# Patient Record
Sex: FEMALE | Race: WHITE | NOT HISPANIC OR LATINO | Employment: PART TIME
[De-identification: names, ages, dates, MRNs, and addresses within clinical notes are randomized per-mention and may not be internally consistent; named-entity substitution may affect disease eponyms.]

---

## 2017-11-26 ENCOUNTER — HEALTH MAINTENANCE LETTER (OUTPATIENT)
Age: 37
End: 2017-11-26

## 2018-07-16 ENCOUNTER — OFFICE VISIT (OUTPATIENT)
Dept: FAMILY MEDICINE | Facility: OTHER | Age: 38
End: 2018-07-16
Attending: NURSE PRACTITIONER
Payer: COMMERCIAL

## 2018-07-16 VITALS
DIASTOLIC BLOOD PRESSURE: 70 MMHG | BODY MASS INDEX: 30.18 KG/M2 | HEIGHT: 66 IN | SYSTOLIC BLOOD PRESSURE: 110 MMHG | TEMPERATURE: 97.9 F | WEIGHT: 187.8 LBS | OXYGEN SATURATION: 100 % | HEART RATE: 63 BPM

## 2018-07-16 DIAGNOSIS — Z76.89 ENCOUNTER TO ESTABLISH CARE: Primary | ICD-10-CM

## 2018-07-16 DIAGNOSIS — N64.4 BREAST PAIN: ICD-10-CM

## 2018-07-16 DIAGNOSIS — N60.19 FIBROCYSTIC BREAST CHANGES, UNSPECIFIED LATERALITY: ICD-10-CM

## 2018-07-16 DIAGNOSIS — Z12.31 ENCOUNTER FOR SCREENING MAMMOGRAM FOR BREAST CANCER: ICD-10-CM

## 2018-07-16 DIAGNOSIS — Z12.4 SCREENING FOR CERVICAL CANCER: ICD-10-CM

## 2018-07-16 DIAGNOSIS — Z00.00 ROUTINE GENERAL MEDICAL EXAMINATION AT A HEALTH CARE FACILITY: ICD-10-CM

## 2018-07-16 LAB
BASOPHILS # BLD AUTO: 0 10E9/L (ref 0–0.2)
BASOPHILS NFR BLD AUTO: 0.5 %
DIFFERENTIAL METHOD BLD: ABNORMAL
EOSINOPHIL # BLD AUTO: 0.3 10E9/L (ref 0–0.7)
EOSINOPHIL NFR BLD AUTO: 4.3 %
ERYTHROCYTE [DISTWIDTH] IN BLOOD BY AUTOMATED COUNT: 13.4 % (ref 10–15)
HCT VFR BLD AUTO: 38.2 % (ref 35–47)
HGB BLD-MCNC: 12.6 G/DL (ref 11.7–15.7)
LYMPHOCYTES # BLD AUTO: 2.1 10E9/L (ref 0.8–5.3)
LYMPHOCYTES NFR BLD AUTO: 32.2 %
MCH RBC QN AUTO: 26.2 PG (ref 26.5–33)
MCHC RBC AUTO-ENTMCNC: 33 G/DL (ref 31.5–36.5)
MCV RBC AUTO: 79 FL (ref 78–100)
MONOCYTES # BLD AUTO: 0.4 10E9/L (ref 0–1.3)
MONOCYTES NFR BLD AUTO: 6.8 %
NEUTROPHILS # BLD AUTO: 3.7 10E9/L (ref 1.6–8.3)
NEUTROPHILS NFR BLD AUTO: 56.2 %
PLATELET # BLD AUTO: 236 10E9/L (ref 150–450)
RBC # BLD AUTO: 4.81 10E12/L (ref 3.8–5.2)
TSH SERPL DL<=0.005 MIU/L-ACNC: 1.84 MU/L (ref 0.4–4)
WBC # BLD AUTO: 6.5 10E9/L (ref 4–11)

## 2018-07-16 PROCEDURE — 99000 SPECIMEN HANDLING OFFICE-LAB: CPT | Performed by: NURSE PRACTITIONER

## 2018-07-16 PROCEDURE — 87624 HPV HI-RISK TYP POOLED RSLT: CPT | Mod: 90 | Performed by: NURSE PRACTITIONER

## 2018-07-16 PROCEDURE — 88142 CYTOPATH C/V THIN LAYER: CPT | Performed by: NURSE PRACTITIONER

## 2018-07-16 PROCEDURE — 85025 COMPLETE CBC W/AUTO DIFF WBC: CPT | Performed by: NURSE PRACTITIONER

## 2018-07-16 PROCEDURE — 84443 ASSAY THYROID STIM HORMONE: CPT | Performed by: NURSE PRACTITIONER

## 2018-07-16 PROCEDURE — 36415 COLL VENOUS BLD VENIPUNCTURE: CPT | Performed by: NURSE PRACTITIONER

## 2018-07-16 PROCEDURE — 99385 PREV VISIT NEW AGE 18-39: CPT | Performed by: NURSE PRACTITIONER

## 2018-07-16 ASSESSMENT — ANXIETY QUESTIONNAIRES
6. BECOMING EASILY ANNOYED OR IRRITABLE: NOT AT ALL
GAD7 TOTAL SCORE: 0
2. NOT BEING ABLE TO STOP OR CONTROL WORRYING: NOT AT ALL
1. FEELING NERVOUS, ANXIOUS, OR ON EDGE: NOT AT ALL
7. FEELING AFRAID AS IF SOMETHING AWFUL MIGHT HAPPEN: NOT AT ALL
3. WORRYING TOO MUCH ABOUT DIFFERENT THINGS: NOT AT ALL
5. BEING SO RESTLESS THAT IT IS HARD TO SIT STILL: NOT AT ALL
4. TROUBLE RELAXING: NOT AT ALL
IF YOU CHECKED OFF ANY PROBLEMS ON THIS QUESTIONNAIRE, HOW DIFFICULT HAVE THESE PROBLEMS MADE IT FOR YOU TO DO YOUR WORK, TAKE CARE OF THINGS AT HOME, OR GET ALONG WITH OTHER PEOPLE: NOT DIFFICULT AT ALL

## 2018-07-16 ASSESSMENT — PAIN SCALES - GENERAL: PAINLEVEL: NO PAIN (0)

## 2018-07-16 NOTE — PROGRESS NOTES
SUBJECTIVE:   CC: Aura Carmona is an 37 year old woman who presents for preventive health visit.       Healthy Habits:    Do you get at least three servings of calcium containing foods daily (dairy, green leafy vegetables, etc.)? yes    Amount of exercise or daily activities, outside of work: Walking    Problems taking medications regularly No    Medication side effects: No    Have you had an eye exam in the past two years? yes    Do you see a dentist twice per year? yes    Do you have sleep apnea, excessive snoring or daytime drowsiness?no      Breast pain - size disparity, pain is intermittent      PHQ-9 SCORE 2018   Total Score 0     LUZ-7 SCORE 2018   Total Score 0         Abuse: Current or Past(Physical, Sexual or Emotional)- No  Do you feel safe in your environment - Yes    Social History   Substance Use Topics     Smoking status: Never Smoker     Smokeless tobacco: Never Used     Alcohol use Yes      Comment: occasional     If you drink alcohol do you typically have >3 drinks per day or >7 drinks per week? Yes - AUDIT SCORE:     No flowsheet data found.Not much.                     Reviewed orders with patient.  Reviewed health maintenance and updated orders accordingly - Yes  Labs reviewed in EPIC  BP Readings from Last 3 Encounters:   18 110/70   14 120/80   13 100/80    Wt Readings from Last 3 Encounters:   18 187 lb 12.8 oz (85.2 kg)   14 185 lb (83.9 kg)   13 180 lb (81.6 kg)                  Patient Active Problem List   Diagnosis     Family history of melanoma     Past Surgical History:   Procedure Laterality Date     APPENDECTOMY  2009    [place]     C/SECTION, CLASSICAL      x's 3      SECTION  11-24-15    laparotomy     CHOLECYSTECTOMY       GALLBLADDER SURGERY      [facility] teeth         Social History   Substance Use Topics     Smoking status: Never Smoker     Smokeless tobacco: Never Used     Alcohol use Yes     "  Comment: occasional     Family History   Problem Relation Age of Onset     Hypertension Father      Diabetes Maternal Grandmother      Alzheimer Disease Maternal Grandfather      HEART DISEASE Paternal Grandmother      HEART DISEASE Paternal Grandfather          No current outpatient prescriptions on file.     No Known Allergies  Recent Labs   Lab Test  13   1459   TSH  1.39        No prior mammogram        History of abnormal Pap smear: NO - age 30-65 PAP every 5 years with negative HPV co-testing recommended    PAP / HPV 2013   PAP NIL     Reviewed and updated as needed this visit by clinical staff  Tobacco  Allergies  Meds  Med Hx  Surg Hx  Fam Hx  Soc Hx        Reviewed and updated as needed this visit by Provider        History reviewed. No pertinent past medical history.   Past Surgical History:   Procedure Laterality Date     APP[place]OMY      [place]     C/SECTION, CLASSICAL      x's 3      SECTION  11-24-15    laparotomy     CHOLECYSTECTOMY       GALLBLADDER SURGERY      [facility]       Obstetric History     No data available          ROS:  CONSTITUTIONAL: NEGATIVE for fever, chills, change in weight  INTEGUMENTARU/SKIN: NEGATIVE for worrisome rashes, moles or lesions  EYES: NEGATIVE for vision changes or irritation  ENT: NEGATIVE for ear, mouth and throat problems  RESP: NEGATIVE for significant cough or SOB  BREAST: breast tenderness  CV: NEGATIVE for chest pain, palpitations or peripheral edema  GI: NEGATIVE for nausea, abdominal pain, heartburn, or change in bowel habits  : NEGATIVE for unusual urinary or vaginal symptoms. Periods are regular.  MUSCULOSKELETAL: NEGATIVE for significant arthralgias or myalgia  NEURO: NEGATIVE for weakness, dizziness or paresthesias  PSYCHIATRIC: NEGATIVE for changes in mood or affect    OBJECTIVE:   /70  Pulse 63  Temp 97.9  F (36.6  C)  Ht 5' 6\" (1.676 m)  Wt 187 lb 12.8 oz (85.2 kg)  SpO2 100%  " Breastfeeding? No  BMI 30.31 kg/m2       EXAM:  GENERAL: healthy, alert and no distress  EYES: Eyes grossly normal to inspection, PERRL and conjunctivae and sclerae normal  HENT: ear canals and TM's normal, nose and mouth without ulcers or lesions  NECK: no adenopathy, no asymmetry, masses, or scars and thyroid normal to palpation  RESP: lungs clear to auscultation - no rales, rhonchi or wheezes  BREAST: Right breast is larger than the left, chronically - mild diffuse breast tenderness bilaterally.  Palpable fibrocystic findings - no nipple discharge, no axillary mass  CV: regular rate and rhythm, normal S1 S2, no S3 or S4, no murmur, click or rub, no peripheral edema and peripheral pulses strong  ABDOMEN: soft, nontender, no hepatosplenomegaly, no masses and bowel sounds normal   (female): normal female external genitalia, normal urethral meatus, vaginal mucosa, normal cervix/adnexa/uterus without masses or discharge  MS: no gross musculoskeletal defects noted, no edema  SKIN: no suspicious lesions or rashes  PSYCH: mentation appears normal, affect normal/bright        ASSESSMENT/PLAN:   1. Encounter to establish care  - Chart updated    2. Routine general medical examination at a health care facility  - TSH with free T4 reflex  - CBC with platelets differential  - Annual physical 1 year    3. Screening for cervical cancer  - A pap thin layer screen with  HPV - recommended age 30 - 65 years (select HPV order below)  - HPV High Risk Types DNA Cervical    4. Encounter for screening mammogram for breast cancer  - MA Screening Digital Bilateral ([place] [facility]); Future    5. Breast pain  - MA Screening Digital Bilateral ([place] [facility]); Future    6. Fibrocystic breast changes, unspecified laterality  - Mammogram ordered        COUNSELING:   Reviewed preventive health counseling, as reflected in patient instructions       Regular exercise       Healthy diet/nutrition    BP Readings from Last 1 Encounters:   07/16/18  "110/70     Estimated body mass index is 30.31 kg/(m^2) as calculated from the following:    Height as of this encounter: 5' 6\" (1.676 m).    Weight as of this encounter: 187 lb 12.8 oz (85.2 kg).           reports that she has never smoked. She has never used smokeless tobacco.      Counseling Resources:  ATP IV Guidelines  Pooled Cohorts Equation Calculator  Breast Cancer Risk Calculator  FRAX Risk Assessment  ICSI Preventive Guidelines  Dietary Guidelines for Americans, 2010  USDA's MyPlate  ASA Prophylaxis  Lung CA Screening    Zunilda Matute NP  [facility] [place]  "

## 2018-07-16 NOTE — MR AVS SNAPSHOT
After Visit Summary   7/16/2018    Aura Carmona    MRN: [id number]           Patient Information     Date Of Birth          1980        Visit Information        Provider Department      7/16/2018 10:00 AM Zunilda Matute NP [facility]        Today's Diagnoses     Encounter to establish care    -  1    Routine general medical examination at a health care facility        Screening for cervical cancer        Encounter for screening mammogram for breast cancer        Breast pain        Fibrocystic breast changes, unspecified laterality          Care Instructions        ASSESSMENT/PLAN:   1. Encounter to establish care  - Chart updated    2. Routine general medical examination at a health care facility  - TSH with free T4 reflex  - CBC with platelets differential  - Annual physical 1 year    3. Screening for cervical cancer  - A pap thin layer screen with  HPV - recommended age 30 - 65 years (select HPV order below)  - HPV High Risk Types DNA Cervical    4. Encounter for screening mammogram for breast cancer  - MA Screening Digital Bilateral ([place] [facility]); Future    5. Breast pain  - MA Screening Digital Bilateral ([place] [facility]); Future    6. Fibrocystic breast changes, unspecified laterality  - Mammogram ordered          Preventive Health Recommendations  Female Ages 26 - 39  Yearly exam:   See your health care provider every year in order to    Review health changes.     Discuss preventive care.      Review your medicines if you your doctor has prescribed any.    Until age 30: Get a Pap test every three years (more often if you have had an abnormal result).    After age 30: Talk to your doctor about whether you should have a Pap test every 3 years or have a Pap test with HPV screening every 5 years.   You do not need a Pap test if your uterus was removed (hysterectomy) and you have not had cancer.  You should be tested each year for STDs (sexually transmitted diseases), if  you're at risk.   Talk to your provider about how often to have your cholesterol checked.  If you are at risk for diabetes, you should have a diabetes test (fasting glucose).  Shots: Get a flu shot each year. Get a tetanus shot every 10 years.   Nutrition:     Eat at least 5 servings of fruits and vegetables each day.    Eat whole-grain bread, whole-wheat pasta and brown rice instead of white grains and rice.    Get adequate Calcium and Vitamin D.     Lifestyle    Exercise at least 150 minutes a week (30 minutes a day, 5 days of the week). This will help you control your weight and prevent disease.    Limit alcohol to one drink per day.    No smoking.     Wear sunscreen to prevent skin cancer.    See your dentist every six months for an exam and cleaning.            Follow-ups after your visit        Future tests that were ordered for you today     Open Future Orders        Priority Expected Expires Ordered    MA Screening Digital Bilateral ([place] [facility]) Routine  7/16/2019 7/16/2018            Who to contact     If you have questions or need follow up information about today's clinic visit or your schedule please contact [facility] directly at [phone].  Normal or non-critical lab and imaging results will be communicated to you by MyChart, letter or phone within 4 business days after the clinic has received the results. If you do not hear from us within 7 days, please contact the clinic through [employer] or phone. If you have a critical or abnormal lab result, we will notify you by phone as soon as possible.  Submit refill requests through "[employer] or call your pharmacy and they will forward the refill request to us. Please allow 3 business days for your refill to be completed.          Additional Information About Your Visit        "[employer] Information     "[employer] lets you send messages to your doctor, view your test results, renew your prescriptions, schedule appointments and more. To sign up, go  "to [email or web address] . Click on \"Log in\" on the left side of the screen, which will take you to the Welcome page. Then click on \"Sign up Now\" on the right side of the page.     You will be asked to enter the access code listed below, as well as some personal information. Please follow the directions to create your username and password.     Your access code is: [email or web address]  Expires: 10/14/2018  9:35 AM     Your access code will  in 90 days. If you need help or a new code, please call your [facility] clinic or [phone].        Care EveryWhere ID     This is your Care EveryWhere ID. This could be used by other organizations to access your [facility] medical records  [id number]        Your Vitals Were     Pulse Temperature Height Pulse Oximetry Breastfeeding? BMI (Body Mass Index)    63 97.9  F (36.6  C) 5' 6\" (1.676 m) 100% No 30.31 kg/m2       Blood Pressure from Last 3 Encounters:   18 110/70   14 120/80   13 100/80    Weight from Last 3 Encounters:   18 187 lb 12.8 oz (85.2 kg)   14 185 lb (83.9 kg)   13 180 lb (81.6 kg)              We Performed the Following     A pap thin layer screen with  HPV - recommended age 30 - 65 years (select HPV order below)     CBC with platelets differential     HPV High Risk Types DNA Cervical     TSH with free T4 reflex        Primary Care Provider Office Phone # Fax #    Zunilda Matute [phone] [address]  [place] [address]        Equal Access to Services     [facility] : Hadii libia Hahn, kayy de la rosa, tammy rodríguezalkeesha rey. So [facility] [phone].    ATENCIÓN: Si habla español, tiene a gilmore disposición servicios gratuitos de asistencia lingüística. Llame al [phone].    We comply with applicable federal civil rights laws and [place] laws. We do not discriminate on the basis of race, color, national origin, age, disability, " sex, sexual orientation, or gender identity.            Thank you!     Thank you for choosing [facility]  for your care. Our goal is always to provide you with excellent care. Hearing back from our patients is one way we can continue to improve our services. Please take a few minutes to complete the written survey that you may receive in the mail after your visit with us. Thank you!             Your Updated Medication List - Protect others around you: Learn how to safely use, store and throw away your medicines at www.disposemymeds.org.      Notice  As of 7/16/2018 11:15 AM    You have not been prescribed any medications.

## 2018-07-16 NOTE — NURSING NOTE
"Chief Complaint   Patient presents with     Establish Care     Physical       Initial /70  Pulse 63  Temp 97.9  F (36.6  C)  Ht 5' 6\" (1.676 m)  Wt 187 lb 12.8 oz (85.2 kg)  SpO2 100%  Breastfeeding? No  BMI 30.31 kg/m2 Estimated body mass index is 30.31 kg/(m^2) as calculated from the following:    Height as of this encounter: 5' 6\" (1.676 m).    Weight as of this encounter: 187 lb 12.8 oz (85.2 kg).  Medication Reconciliation: complete    DARLING Dawn    "

## 2018-07-16 NOTE — PATIENT INSTRUCTIONS
ASSESSMENT/PLAN:   1. Encounter to establish care  - Chart updated    2. Routine general medical examination at a health care facility  - TSH with free T4 reflex  - CBC with platelets differential  - Annual physical 1 year    3. Screening for cervical cancer  - A pap thin layer screen with  HPV - recommended age 30 - 65 years (select HPV order below)  - HPV High Risk Types DNA Cervical    4. Encounter for screening mammogram for breast cancer  - MA Screening Digital Bilateral ([facility]); Future    5. Breast pain  - MA Screening Digital Bilateral ([facility]); Future    6. Fibrocystic breast changes, unspecified laterality  - Mammogram ordered          Preventive Health Recommendations  Female Ages 26 - 39  Yearly exam:   See your health care provider every year in order to    Review health changes.     Discuss preventive care.      Review your medicines if you your doctor has prescribed any.    Until age 30: Get a Pap test every three years (more often if you have had an abnormal result).    After age 30: Talk to your doctor about whether you should have a Pap test every 3 years or have a Pap test with HPV screening every 5 years.   You do not need a Pap test if your uterus was removed (hysterectomy) and you have not had cancer.  You should be tested each year for STDs (sexually transmitted diseases), if you're at risk.   Talk to your provider about how often to have your cholesterol checked.  If you are at risk for diabetes, you should have a diabetes test (fasting glucose).  Shots: Get a flu shot each year. Get a tetanus shot every 10 years.   Nutrition:     Eat at least 5 servings of fruits and vegetables each day.    Eat whole-grain bread, whole-wheat pasta and brown rice instead of white grains and rice.    Get adequate Calcium and Vitamin D.     Lifestyle    Exercise at least 150 minutes a week (30 minutes a day, 5 days of the week). This will help you control your weight and prevent  disease.    Limit alcohol to one drink per day.    No smoking.     Wear sunscreen to prevent skin cancer.    See your dentist every six months for an exam and cleaning.

## 2018-07-17 ASSESSMENT — ANXIETY QUESTIONNAIRES: GAD7 TOTAL SCORE: 0

## 2018-07-17 ASSESSMENT — PATIENT HEALTH QUESTIONNAIRE - PHQ9: SUM OF ALL RESPONSES TO PHQ QUESTIONS 1-9: 0

## 2018-07-18 ENCOUNTER — HEALTH MAINTENANCE LETTER (OUTPATIENT)
Age: 38
End: 2018-07-18

## 2018-07-20 LAB
COPATH REPORT: NORMAL
PAP: NORMAL

## 2018-07-23 LAB
FINAL DIAGNOSIS: NORMAL
HPV HR 12 DNA CVX QL NAA+PROBE: NEGATIVE
HPV16 DNA SPEC QL NAA+PROBE: NEGATIVE
HPV18 DNA SPEC QL NAA+PROBE: NEGATIVE
SPECIMEN DESCRIPTION: NORMAL
SPECIMEN SOURCE CVX/VAG CYTO: NORMAL

## 2018-08-01 ENCOUNTER — RADIANT APPOINTMENT (OUTPATIENT)
Dept: MAMMOGRAPHY | Facility: OTHER | Age: 38
End: 2018-08-01
Attending: NURSE PRACTITIONER
Payer: COMMERCIAL

## 2018-08-01 DIAGNOSIS — N64.4 BREAST PAIN: ICD-10-CM

## 2018-08-01 DIAGNOSIS — Z12.31 ENCOUNTER FOR SCREENING MAMMOGRAM FOR BREAST CANCER: ICD-10-CM

## 2018-08-01 PROCEDURE — 77067 SCR MAMMO BI INCL CAD: CPT | Mod: TC

## 2019-09-26 ENCOUNTER — TRANSFERRED RECORDS (OUTPATIENT)
Dept: HEALTH INFORMATION MANAGEMENT | Facility: CLINIC | Age: 39
End: 2019-09-26

## 2019-09-30 ENCOUNTER — OFFICE VISIT (OUTPATIENT)
Dept: FAMILY MEDICINE | Facility: OTHER | Age: 39
End: 2019-09-30
Attending: NURSE PRACTITIONER
Payer: COMMERCIAL

## 2019-09-30 VITALS
HEART RATE: 70 BPM | SYSTOLIC BLOOD PRESSURE: 112 MMHG | DIASTOLIC BLOOD PRESSURE: 82 MMHG | WEIGHT: 187.5 LBS | OXYGEN SATURATION: 99 % | BODY MASS INDEX: 30.26 KG/M2

## 2019-09-30 DIAGNOSIS — I49.9 IRREGULAR HEART RATE: Primary | ICD-10-CM

## 2019-09-30 DIAGNOSIS — Z12.31 ENCOUNTER FOR SCREENING MAMMOGRAM FOR BREAST CANCER: ICD-10-CM

## 2019-09-30 LAB
BASOPHILS # BLD AUTO: 0 10E9/L (ref 0–0.2)
BASOPHILS NFR BLD AUTO: 0.6 %
DIFFERENTIAL METHOD BLD: NORMAL
EOSINOPHIL # BLD AUTO: 0.2 10E9/L (ref 0–0.7)
EOSINOPHIL NFR BLD AUTO: 2.5 %
ERYTHROCYTE [DISTWIDTH] IN BLOOD BY AUTOMATED COUNT: 13.3 % (ref 10–15)
HCT VFR BLD AUTO: 38.8 % (ref 35–47)
HGB BLD-MCNC: 12.6 G/DL (ref 11.7–15.7)
LYMPHOCYTES # BLD AUTO: 2.2 10E9/L (ref 0.8–5.3)
LYMPHOCYTES NFR BLD AUTO: 31.6 %
MCH RBC QN AUTO: 26.6 PG (ref 26.5–33)
MCHC RBC AUTO-ENTMCNC: 32.5 G/DL (ref 31.5–36.5)
MCV RBC AUTO: 82 FL (ref 78–100)
MONOCYTES # BLD AUTO: 0.4 10E9/L (ref 0–1.3)
MONOCYTES NFR BLD AUTO: 6 %
NEUTROPHILS # BLD AUTO: 4 10E9/L (ref 1.6–8.3)
NEUTROPHILS NFR BLD AUTO: 59.3 %
PLATELET # BLD AUTO: 261 10E9/L (ref 150–450)
RBC # BLD AUTO: 4.74 10E12/L (ref 3.8–5.2)
WBC # BLD AUTO: 6.8 10E9/L (ref 4–11)

## 2019-09-30 PROCEDURE — 80050 GENERAL HEALTH PANEL: CPT | Performed by: NURSE PRACTITIONER

## 2019-09-30 PROCEDURE — 99214 OFFICE O/P EST MOD 30 MIN: CPT | Performed by: NURSE PRACTITIONER

## 2019-09-30 PROCEDURE — 36415 COLL VENOUS BLD VENIPUNCTURE: CPT | Performed by: NURSE PRACTITIONER

## 2019-09-30 PROCEDURE — 83036 HEMOGLOBIN GLYCOSYLATED A1C: CPT | Performed by: NURSE PRACTITIONER

## 2019-09-30 ASSESSMENT — PAIN SCALES - GENERAL: PAINLEVEL: NO PAIN (0)

## 2019-09-30 ASSESSMENT — PATIENT HEALTH QUESTIONNAIRE - PHQ9
5. POOR APPETITE OR OVEREATING: NOT AT ALL
SUM OF ALL RESPONSES TO PHQ QUESTIONS 1-9: 0

## 2019-09-30 ASSESSMENT — ANXIETY QUESTIONNAIRES
7. FEELING AFRAID AS IF SOMETHING AWFUL MIGHT HAPPEN: NOT AT ALL
3. WORRYING TOO MUCH ABOUT DIFFERENT THINGS: NOT AT ALL
1. FEELING NERVOUS, ANXIOUS, OR ON EDGE: NOT AT ALL
6. BECOMING EASILY ANNOYED OR IRRITABLE: NOT AT ALL
2. NOT BEING ABLE TO STOP OR CONTROL WORRYING: NOT AT ALL
IF YOU CHECKED OFF ANY PROBLEMS ON THIS QUESTIONNAIRE, HOW DIFFICULT HAVE THESE PROBLEMS MADE IT FOR YOU TO DO YOUR WORK, TAKE CARE OF THINGS AT HOME, OR GET ALONG WITH OTHER PEOPLE: NOT DIFFICULT AT ALL
5. BEING SO RESTLESS THAT IT IS HARD TO SIT STILL: NOT AT ALL
GAD7 TOTAL SCORE: 0

## 2019-09-30 NOTE — PROGRESS NOTES
Aura Carmona is a 38 year old female who presents to clinic today for the following health issues:      Irregular heart beat    Duration: ongoing for years    Patient had check up at workplace and EKG showed irregularities in the heart rate and are requesting that she follow up and be cleared. No other symptoms present.    Therapies tried and outcome: None         She was at work on a conference call at [employer], and noted rapid heart rate  An EKG was done, and was normal.  A later EKG was done and was also normal.  Labs were normal (Glucose, and UA)    She feels this has occurred before, and feels this may be hormonal.       Patient Active Problem List   Diagnosis     Family history of melanoma     Fibrocystic breast changes     Past Surgical History:   Procedure Laterality Date     APPENDECTOMY  2009    [place]     C/SECTION, CLASSICAL      x's 3      SECTION  11-24-15    laparotomy     CHOLECYSTECTOMY       GALLBLADDER SURGERY      [facility]         Social History     Tobacco Use     Smoking status: Never Smoker     Smokeless tobacco: Never Used   Substance Use Topics     Alcohol use: Yes     Comment: occasional     Family History   Problem Relation Age of Onset     Hypertension Father      Diabetes Maternal Grandmother      Alzheimer Disease Maternal Grandfather      Heart Disease Paternal Grandmother      Heart Disease Paternal Grandfather          No current outpatient medications on file.     No Known Allergies  Recent Labs   Lab Test 18  1123 13  1459   TSH 1.84 1.39      BP Readings from Last 3 Encounters:   19 112/82   18 110/70   14 120/80    Wt Readings from Last 3 Encounters:   19 85 kg (187 lb 8 oz)   18 85.2 kg (187 lb 12.8 oz)   14 83.9 kg (185 lb)                 Reviewed and updated as needed this visit by Provider         Review of Systems         Objective    /82 (BP Location: Right arm, Patient  Position: Sitting, Cuff Size: Adult Regular)   Pulse 70   Wt 85 kg (187 lb 8 oz)   SpO2 99%   BMI 30.26 kg/m    Body mass index is 30.26 kg/m .       Physical Exam   GENERAL: healthy, alert and no distress  EYES: Eyes grossly normal to inspection, PERRL and conjunctivae and sclerae normal  HENT: ear canals and TM's normal, nose and mouth without ulcers or lesions  NECK: no adenopathy, no asymmetry, masses, or scars and thyroid normal to palpation  RESP: lungs clear to auscultation - no rales, rhonchi or wheezes  CV: regular rate and rhythm, normal S1 S2, no S3 or S4, no murmur, click or rub, no peripheral edema and peripheral pulses strong  MS: no gross musculoskeletal defects noted, no edema  SKIN: no suspicious lesions or rashes  PSYCH: mentation appears normal, affect normal/bright            Assessment & Plan     1. Irregular heart rate  - Zio Patch (RANGE) Adult Pediatric > 48 hours; Future  - TSH with free T4 reflex  - Comprehensive metabolic panel (BMP + Alb, Alk Phos, ALT, AST, Total. Bili, TP)  - CBC with platelets differential  - Hemoglobin A1c    2. Encounter for screening mammogram for breast cancer  - MA SCREENING DIGITAL BILATERAL ([place]); Future       To ER with any changes or concerns      Zunilda Matute, NATANAEL  [facility] [place]

## 2019-09-30 NOTE — PATIENT INSTRUCTIONS
Assessment & Plan     1. Irregular heart rate  - Zio Patch (RANGE) Adult Pediatric > 48 hours; Future  - TSH with free T4 reflex  - Comprehensive metabolic panel (BMP + Alb, Alk Phos, ALT, AST, Total. Bili, TP)  - CBC with platelets differential  - Hemoglobin A1c    2. Encounter for screening mammogram for breast cancer  - MA SCREENING DIGITAL BILATERAL ([place]); Future       To ER with any changes or concerns      Zunilda Matute CNP  [facility] [place]

## 2019-09-30 NOTE — NURSING NOTE
"Chief Complaint   Patient presents with     Irregular Heart Beat       Initial /82 (BP Location: Right arm, Patient Position: Sitting, Cuff Size: Adult Regular)   Pulse 70   Wt 85 kg (187 lb 8 oz)   SpO2 99%   BMI 30.26 kg/m   Estimated body mass index is 30.26 kg/m  as calculated from the following:    Height as of 7/16/18: 1.676 m (5' 6\").    Weight as of this encounter: 85 kg (187 lb 8 oz).  Medication Reconciliation: complete  Ashley A. Lechevalier, LPN  "

## 2019-10-01 LAB
ALBUMIN SERPL-MCNC: 4.2 G/DL (ref 3.4–5)
ALP SERPL-CCNC: 49 U/L (ref 40–150)
ALT SERPL W P-5'-P-CCNC: 15 U/L (ref 0–50)
ANION GAP SERPL CALCULATED.3IONS-SCNC: 8 MMOL/L (ref 3–14)
AST SERPL W P-5'-P-CCNC: 15 U/L (ref 0–45)
BILIRUB SERPL-MCNC: 0.5 MG/DL (ref 0.2–1.3)
BUN SERPL-MCNC: 12 MG/DL (ref 7–30)
CALCIUM SERPL-MCNC: 8.8 MG/DL (ref 8.5–10.1)
CHLORIDE SERPL-SCNC: 107 MMOL/L (ref 94–109)
CO2 SERPL-SCNC: 24 MMOL/L (ref 20–32)
CREAT SERPL-MCNC: 0.72 MG/DL (ref 0.52–1.04)
EST. AVERAGE GLUCOSE BLD GHB EST-MCNC: 100 MG/DL
GFR SERPL CREATININE-BSD FRML MDRD: >90 ML/MIN/{1.73_M2}
GLUCOSE SERPL-MCNC: 83 MG/DL (ref 70–99)
HBA1C MFR BLD: 5.1 % (ref 0–5.6)
POTASSIUM SERPL-SCNC: 4 MMOL/L (ref 3.4–5.3)
PROT SERPL-MCNC: 8.1 G/DL (ref 6.8–8.8)
SODIUM SERPL-SCNC: 139 MMOL/L (ref 133–144)
TSH SERPL DL<=0.005 MIU/L-ACNC: 1.68 MU/L (ref 0.4–4)

## 2019-10-01 ASSESSMENT — ANXIETY QUESTIONNAIRES: GAD7 TOTAL SCORE: 0

## 2019-10-11 ENCOUNTER — ANCILLARY PROCEDURE (OUTPATIENT)
Dept: MAMMOGRAPHY | Facility: OTHER | Age: 39
End: 2019-10-11
Attending: NURSE PRACTITIONER
Payer: COMMERCIAL

## 2019-10-11 ENCOUNTER — HOSPITAL ENCOUNTER (OUTPATIENT)
Dept: CARDIOLOGY | Facility: HOSPITAL | Age: 39
Discharge: HOME | End: 2019-10-11
Attending: NURSE PRACTITIONER | Admitting: NURSE PRACTITIONER
Payer: COMMERCIAL

## 2019-10-11 DIAGNOSIS — Z12.31 ENCOUNTER FOR SCREENING MAMMOGRAM FOR BREAST CANCER: ICD-10-CM

## 2019-10-11 DIAGNOSIS — I49.9 IRREGULAR HEART RATE: ICD-10-CM

## 2019-10-11 PROCEDURE — 77067 SCR MAMMO BI INCL CAD: CPT | Mod: TC

## 2019-10-11 PROCEDURE — 0296T ZIO PATCH HOLTER ADULT PEDIATRIC GREATER THAN 48 HRS: CPT | Mod: TC

## 2019-10-11 PROCEDURE — 77063 BREAST TOMOSYNTHESIS BI: CPT | Mod: TC

## 2019-10-11 PROCEDURE — 0298T ZIO PATCH HOLTER ADULT PEDIATRIC GREATER THAN 48 HRS: CPT | Performed by: INTERNAL MEDICINE

## 2019-10-18 ENCOUNTER — HOSPITAL ENCOUNTER (EMERGENCY)
Facility: HOSPITAL | Age: 39
Discharge: HOME | End: 2019-10-18
Attending: FAMILY MEDICINE | Admitting: FAMILY MEDICINE
Payer: COMMERCIAL

## 2019-10-18 ENCOUNTER — TELEPHONE (OUTPATIENT)
Dept: FAMILY MEDICINE | Facility: OTHER | Age: 39
End: 2019-10-18

## 2019-10-18 ENCOUNTER — APPOINTMENT (OUTPATIENT)
Dept: GENERAL RADIOLOGY | Facility: HOSPITAL | Age: 39
End: 2019-10-18
Attending: FAMILY MEDICINE
Payer: COMMERCIAL

## 2019-10-18 VITALS
RESPIRATION RATE: 16 BRPM | TEMPERATURE: 97.9 F | OXYGEN SATURATION: 100 % | DIASTOLIC BLOOD PRESSURE: 89 MMHG | SYSTOLIC BLOOD PRESSURE: 125 MMHG

## 2019-10-18 DIAGNOSIS — R00.2 PALPITATIONS: ICD-10-CM

## 2019-10-18 LAB
ALBUMIN SERPL-MCNC: 4.3 G/DL (ref 3.4–5)
ALBUMIN UR-MCNC: NEGATIVE MG/DL
ALP SERPL-CCNC: 60 U/L (ref 40–150)
ALT SERPL W P-5'-P-CCNC: 23 U/L (ref 0–50)
AMPHETAMINES UR QL: NOT DETECTED NG/ML
ANION GAP SERPL CALCULATED.3IONS-SCNC: 6 MMOL/L (ref 3–14)
APPEARANCE UR: CLEAR
AST SERPL W P-5'-P-CCNC: 21 U/L (ref 0–45)
BACTERIA #/AREA URNS HPF: ABNORMAL /HPF
BARBITURATES UR QL SCN: NOT DETECTED NG/ML
BASOPHILS # BLD AUTO: 0.1 10E9/L (ref 0–0.2)
BASOPHILS NFR BLD AUTO: 1 %
BENZODIAZ UR QL SCN: NOT DETECTED NG/ML
BILIRUB SERPL-MCNC: 0.6 MG/DL (ref 0.2–1.3)
BILIRUB UR QL STRIP: NEGATIVE
BUN SERPL-MCNC: 8 MG/DL (ref 7–30)
BUPRENORPHINE UR QL: NOT DETECTED NG/ML
CALCIUM SERPL-MCNC: 8.8 MG/DL (ref 8.5–10.1)
CANNABINOIDS UR QL: NOT DETECTED NG/ML
CHLORIDE SERPL-SCNC: 108 MMOL/L (ref 94–109)
CO2 SERPL-SCNC: 24 MMOL/L (ref 20–32)
COCAINE UR QL SCN: NOT DETECTED NG/ML
COLOR UR AUTO: ABNORMAL
CREAT SERPL-MCNC: 0.7 MG/DL (ref 0.52–1.04)
D DIMER PPP DDU-MCNC: 215 NG/ML D-DU (ref 0–300)
D-METHAMPHET UR QL: NOT DETECTED NG/ML
DIFFERENTIAL METHOD BLD: ABNORMAL
EOSINOPHIL # BLD AUTO: 0.2 10E9/L (ref 0–0.7)
EOSINOPHIL NFR BLD AUTO: 2.9 %
ERYTHROCYTE [DISTWIDTH] IN BLOOD BY AUTOMATED COUNT: 13.2 % (ref 10–15)
GFR SERPL CREATININE-BSD FRML MDRD: >90 ML/MIN/{1.73_M2}
GLUCOSE SERPL-MCNC: 77 MG/DL (ref 70–99)
GLUCOSE UR STRIP-MCNC: NEGATIVE MG/DL
HCT VFR BLD AUTO: 40.1 % (ref 35–47)
HGB BLD-MCNC: 13.3 G/DL (ref 11.7–15.7)
HGB UR QL STRIP: NEGATIVE
IMM GRANULOCYTES # BLD: 0 10E9/L (ref 0–0.4)
IMM GRANULOCYTES NFR BLD: 0.4 %
KETONES UR STRIP-MCNC: NEGATIVE MG/DL
LEUKOCYTE ESTERASE UR QL STRIP: NEGATIVE
LYMPHOCYTES # BLD AUTO: 2.5 10E9/L (ref 0.8–5.3)
LYMPHOCYTES NFR BLD AUTO: 37.2 %
MCH RBC QN AUTO: 26.3 PG (ref 26.5–33)
MCHC RBC AUTO-ENTMCNC: 33.2 G/DL (ref 31.5–36.5)
MCV RBC AUTO: 79 FL (ref 78–100)
METHADONE UR QL SCN: NOT DETECTED NG/ML
MONOCYTES # BLD AUTO: 0.6 10E9/L (ref 0–1.3)
MONOCYTES NFR BLD AUTO: 8.9 %
MUCOUS THREADS #/AREA URNS LPF: PRESENT /LPF
NEUTROPHILS # BLD AUTO: 3.4 10E9/L (ref 1.6–8.3)
NEUTROPHILS NFR BLD AUTO: 49.6 %
NITRATE UR QL: NEGATIVE
NRBC # BLD AUTO: 0 10*3/UL
NRBC BLD AUTO-RTO: 0 /100
OPIATES UR QL SCN: NOT DETECTED NG/ML
OXYCODONE UR QL SCN: NOT DETECTED NG/ML
PCP UR QL SCN: NOT DETECTED NG/ML
PH UR STRIP: 7 PH (ref 4.7–8)
PLATELET # BLD AUTO: 270 10E9/L (ref 150–450)
POTASSIUM SERPL-SCNC: 3.5 MMOL/L (ref 3.4–5.3)
PROPOXYPH UR QL: NOT DETECTED NG/ML
PROT SERPL-MCNC: 8.3 G/DL (ref 6.8–8.8)
RBC # BLD AUTO: 5.05 10E12/L (ref 3.8–5.2)
RBC #/AREA URNS AUTO: <1 /HPF (ref 0–2)
SODIUM SERPL-SCNC: 138 MMOL/L (ref 133–144)
SOURCE: ABNORMAL
SP GR UR STRIP: 1 (ref 1–1.03)
TRICYCLICS UR QL SCN: NOT DETECTED NG/ML
TROPONIN I SERPL-MCNC: <0.015 UG/L (ref 0–0.04)
TSH SERPL DL<=0.005 MIU/L-ACNC: 2.28 MU/L (ref 0.4–4)
UROBILINOGEN UR STRIP-MCNC: NORMAL MG/DL (ref 0–2)
WBC # BLD AUTO: 6.8 10E9/L (ref 4–11)
WBC #/AREA URNS AUTO: 1 /HPF (ref 0–5)

## 2019-10-18 PROCEDURE — 84484 ASSAY OF TROPONIN QUANT: CPT | Performed by: FAMILY MEDICINE

## 2019-10-18 PROCEDURE — 80306 DRUG TEST PRSMV INSTRMNT: CPT | Performed by: FAMILY MEDICINE

## 2019-10-18 PROCEDURE — 99285 EMERGENCY DEPT VISIT HI MDM: CPT | Mod: Z6 | Performed by: FAMILY MEDICINE

## 2019-10-18 PROCEDURE — 84443 ASSAY THYROID STIM HORMONE: CPT | Performed by: FAMILY MEDICINE

## 2019-10-18 PROCEDURE — 25000132 ZZH RX MED GY IP 250 OP 250 PS 637: Performed by: FAMILY MEDICINE

## 2019-10-18 PROCEDURE — 71045 X-RAY EXAM CHEST 1 VIEW: CPT | Mod: TC

## 2019-10-18 PROCEDURE — 81001 URINALYSIS AUTO W/SCOPE: CPT | Performed by: FAMILY MEDICINE

## 2019-10-18 PROCEDURE — 93010 ELECTROCARDIOGRAM REPORT: CPT | Performed by: INTERNAL MEDICINE

## 2019-10-18 PROCEDURE — 36415 COLL VENOUS BLD VENIPUNCTURE: CPT | Performed by: FAMILY MEDICINE

## 2019-10-18 PROCEDURE — 99285 EMERGENCY DEPT VISIT HI MDM: CPT | Mod: 25

## 2019-10-18 PROCEDURE — 85379 FIBRIN DEGRADATION QUANT: CPT | Performed by: FAMILY MEDICINE

## 2019-10-18 PROCEDURE — 93005 ELECTROCARDIOGRAM TRACING: CPT

## 2019-10-18 PROCEDURE — 80053 COMPREHEN METABOLIC PANEL: CPT | Performed by: FAMILY MEDICINE

## 2019-10-18 PROCEDURE — 85025 COMPLETE CBC W/AUTO DIFF WBC: CPT | Performed by: FAMILY MEDICINE

## 2019-10-18 RX ORDER — ASPIRIN 81 MG/1
324 TABLET, CHEWABLE ORAL ONCE
Status: COMPLETED | OUTPATIENT
Start: 2019-10-18 | End: 2019-10-18

## 2019-10-18 RX ADMIN — ASPIRIN 81 MG 324 MG: 81 TABLET ORAL at 09:49

## 2019-10-18 ASSESSMENT — ENCOUNTER SYMPTOMS
CONSTITUTIONAL NEGATIVE: 1
NECK STIFFNESS: 0
GASTROINTESTINAL NEGATIVE: 1
ABDOMINAL PAIN: 0
HEMATOLOGIC/LYMPHATIC NEGATIVE: 1
SHORTNESS OF BREATH: 0
ENDOCRINE NEGATIVE: 1
DIFFICULTY URINATING: 0
EYE REDNESS: 0
RESPIRATORY NEGATIVE: 1
FEVER: 0
PALPITATIONS: 1
ARTHRALGIAS: 0
ALLERGIC/IMMUNOLOGIC NEGATIVE: 1
CONFUSION: 0
MUSCULOSKELETAL NEGATIVE: 1
NEUROLOGICAL NEGATIVE: 1
COLOR CHANGE: 0
NERVOUS/ANXIOUS: 1
HEADACHES: 0
EYES NEGATIVE: 1

## 2019-10-18 NOTE — ED NOTES
Discharge instructions reviewed.  Patient verbalized understanding and has no further questions at this time.  Patient will follow up with Zunilda Matute and cardiology; will return to the emergency room if symptoms return and/or any new concerns.  Home.

## 2019-10-18 NOTE — TELEPHONE ENCOUNTER
Patient is to be scheduled with Dr Yan.  Aneudy was working on this.    Zunilda Matute [facility]  [phone]

## 2019-10-18 NOTE — ED NOTES
Diagnostic imaging notified that pt will not be able to attend her scheduled mammogram appointment.

## 2019-10-18 NOTE — TELEPHONE ENCOUNTER
Pt called stating she was seen in the ER today for heart palpitations.  Pt was advised to let her PCP know of this current medical issue and wants to know if Zunilda would like her to make an appt to be seen by her.  Please advise.  ANIBAL NOLEN LPN

## 2019-10-18 NOTE — ED PROVIDER NOTES
History     Chief Complaint   Patient presents with     Palpitations     HPI  Aura Carmona is a 38 year old female who complains of palpitations that occurred in route to the hospital.  She has a history of palpitations.  Last episode was about 3 weeks ago.  She was seen by her primary care provider last week and a FAYE PATCH was applied.  She removed that this morning because she is supposed to have a mammogram.  Upon initial evaluation by nursing in the emergency room she was tachycardic.  EKG revealed an SVT type a pattern with a rate of 215.  Ice was applied to her neck and she cardioverted into a sinus rhythm with a rate of about 100.  Symptoms resolved.  Last menstrual period was about 3 weeks ago.  3 pregnancies 3 children.    Allergies:  No Known Allergies    Problem List:    Patient Active Problem List    Diagnosis Date Noted     Fibrocystic breast changes 2018     Priority: Medium     Family history of melanoma 10/23/2009     Priority: Medium        Past Medical History:    Past Medical History:   Diagnosis Date     Fibrocystic breast changes 2018       Past Surgical History:    Past Surgical History:   Procedure Laterality Date     APPENDECTOMY      [place]     C/SECTION, CLASSICAL      x's 3      SECTION  11-24-15    laparotomy     CHOLECYSTECTOMY       GALLBLADDER SURGERY      [facility]     wisdom teeth         Family History:    Family History   Problem Relation Age of Onset     Hypertension Father      Diabetes Maternal Grandmother      Alzheimer Disease Maternal Grandfather      Heart Disease Paternal Grandmother      Heart Disease Paternal Grandfather        Social History:  Marital Status:   [2]  Social History     Tobacco Use     Smoking status: Never Smoker     Smokeless tobacco: Never Used   Substance Use Topics     Alcohol use: Yes     Comment: occasional     Drug use: No        Medications:    No current outpatient medications on  file.        Review of Systems   Constitutional: Negative.  Negative for fever.   HENT: Negative.  Negative for congestion.    Eyes: Negative.  Negative for redness.   Respiratory: Negative.  Negative for shortness of breath.    Cardiovascular: Positive for palpitations. Negative for leg swelling.   Gastrointestinal: Negative.  Negative for abdominal pain.   Endocrine: Negative.    Genitourinary: Negative.  Negative for difficulty urinating.   Musculoskeletal: Negative.  Negative for arthralgias and neck stiffness.   Skin: Negative.  Negative for color change.   Allergic/Immunologic: Negative.    Neurological: Negative.  Negative for headaches.   Hematological: Negative.    Psychiatric/Behavioral: Negative for confusion. The patient is nervous/anxious (With the palpitations).    All other systems reviewed and are negative.      Physical Exam   BP: (!) 131/105  Heart Rate: (!) 184  Temp: 97.9  F (36.6  C)  Resp: 16  SpO2: 100 %      Physical Exam  Vitals signs and nursing note reviewed. Exam conducted with a chaperone present.   Constitutional:       General: She is not in acute distress.     Appearance: Normal appearance. She is not ill-appearing, toxic-appearing or diaphoretic.   HENT:      Head: Normocephalic and atraumatic.      Right Ear: Tympanic membrane, ear canal and external ear normal.      Left Ear: Tympanic membrane, ear canal and external ear normal.      Nose: Nose normal. No congestion or rhinorrhea.      Mouth/Throat:      Mouth: Mucous membranes are moist.      Pharynx: Oropharynx is clear. No oropharyngeal exudate or posterior oropharyngeal erythema.   Eyes:      General:         Right eye: No discharge.         Left eye: No discharge.      Extraocular Movements: Extraocular movements intact.      Pupils: Pupils are equal, round, and reactive to light.   Neck:      Musculoskeletal: Normal range of motion. No neck rigidity.   Cardiovascular:      Rate and Rhythm: Normal rate and regular rhythm.       Pulses: Normal pulses.      Heart sounds: No murmur. No friction rub. No gallop.    Pulmonary:      Effort: Pulmonary effort is normal. No respiratory distress.      Breath sounds: Normal breath sounds. No wheezing or rales.   Abdominal:      General: There is no distension.   Musculoskeletal: Normal range of motion.         General: No swelling, tenderness, deformity or signs of injury.      Right lower leg: No edema.      Left lower leg: No edema.   Lymphadenopathy:      Cervical: No cervical adenopathy.   Skin:     General: Skin is warm and dry.   Neurological:      General: No focal deficit present.      Mental Status: She is alert.      Cranial Nerves: No cranial nerve deficit.   Psychiatric:         Mood and Affect: Mood normal.         Behavior: Behavior normal.         Thought Content: Thought content normal.         Judgement: Judgment normal.         ED Course        Procedures               Critical Care time:  none               Results for orders placed or performed during the hospital encounter of 10/18/19 (from the past 24 hour(s))   CBC with platelets differential   Result Value Ref Range    WBC 6.8 4.0 - 11.0 10e9/L    RBC Count 5.05 3.8 - 5.2 10e12/L    Hemoglobin 13.3 11.7 - 15.7 g/dL    Hematocrit 40.1 35.0 - 47.0 %    MCV 79 78 - 100 fl    MCH 26.3 (L) 26.5 - 33.0 pg    MCHC 33.2 31.5 - 36.5 g/dL    RDW 13.2 10.0 - 15.0 %    Platelet Count 270 150 - 450 10e9/L    Diff Method Automated Method     % Neutrophils 49.6 %    % Lymphocytes 37.2 %    % Monocytes 8.9 %    % Eosinophils 2.9 %    % Basophils 1.0 %    % Immature Granulocytes 0.4 %    Nucleated RBCs 0 0 /100    Absolute Neutrophil 3.4 1.6 - 8.3 10e9/L    Absolute Lymphocytes 2.5 0.8 - 5.3 10e9/L    Absolute Monocytes 0.6 0.0 - 1.3 10e9/L    Absolute Eosinophils 0.2 0.0 - 0.7 10e9/L    Absolute Basophils 0.1 0.0 - 0.2 10e9/L    Abs Immature Granulocytes 0.0 0 - 0.4 10e9/L    Absolute Nucleated RBC 0.0    Troponin I   Result Value Ref Range     Troponin I ES <0.015 0.000 - 0.045 ug/L   Comprehensive metabolic panel   Result Value Ref Range    Sodium 138 133 - 144 mmol/L    Potassium 3.5 3.4 - 5.3 mmol/L    Chloride 108 94 - 109 mmol/L    Carbon Dioxide 24 20 - 32 mmol/L    Anion Gap 6 3 - 14 mmol/L    Glucose 77 70 - 99 mg/dL    Urea Nitrogen 8 7 - 30 mg/dL    Creatinine 0.70 0.52 - 1.04 mg/dL    GFR Estimate >90 >60 mL/min/[1.73_m2]    GFR Estimate If Black >90 >60 mL/min/[1.73_m2]    Calcium 8.8 8.5 - 10.1 mg/dL    Bilirubin Total 0.6 0.2 - 1.3 mg/dL    Albumin 4.3 3.4 - 5.0 g/dL    Protein Total 8.3 6.8 - 8.8 g/dL    Alkaline Phosphatase 60 40 - 150 U/L    ALT 23 0 - 50 U/L    AST 21 0 - 45 U/L   D-Dimer ([facility])   Result Value Ref Range    D-Dimer ng/mL 215 0 - 300 ng/ml D-DU   TSH   Result Value Ref Range    TSH 2.28 0.40 - 4.00 mU/L   XR Chest Port 1 View    Narrative    PROCEDURE:  XR CHEST PORT 1 VW    HISTORY:  PALPITATIONS.     COMPARISON:  None.    FINDINGS:   The cardiac silhouette is normal in size. The pulmonary vasculature is  normal.  The lungs are clear. No pleural effusion or pneumothorax.      Impression    IMPRESSION:  No acute cardiopulmonary disease.      LIZZY GRIGSBY MD   UA with Microscopic reflex to Culture   Result Value Ref Range    Color Urine Straw     Appearance Urine Clear     Glucose Urine Negative NEG^Negative mg/dL    Bilirubin Urine Negative NEG^Negative    Ketones Urine Negative NEG^Negative mg/dL    Specific Gravity Urine 1.003 1.003 - 1.035    Blood Urine Negative NEG^Negative    pH Urine 7.0 4.7 - 8.0 pH    Protein Albumin Urine Negative NEG^Negative mg/dL    Urobilinogen mg/dL Normal 0.0 - 2.0 mg/dL    Nitrite Urine Negative NEG^Negative    Leukocyte Esterase Urine Negative NEG^Negative    Source Unspecified Urine     WBC Urine 1 0 - 5 /HPF    RBC Urine <1 0 - 2 /HPF    Bacteria Urine Few (A) NEG^Negative /HPF    Mucous Urine Present (A) NEG^Negative /LPF   Urine Drugs of Abuse Screen Panel 13   Result Value Ref  Range    Cannabinoids (51-jva-3-carboxy-9-THC) Not Detected NDET^Not Detected ng/mL    Phencyclidine (Phencyclidine) Not Detected NDET^Not Detected ng/mL    Cocaine (Benzoylecgonine) Not Detected NDET^Not Detected ng/mL    Methamphetamine (d-Methamphetamine) Not Detected NDET^Not Detected ng/mL    Opiates (Morphine) Not Detected NDET^Not Detected ng/mL    Amphetamine (d-Amphetamine) Not Detected NDET^Not Detected ng/mL    Benzodiazepines (Nordiazepam) Not Detected NDET^Not Detected ng/mL    Tricyclic Antidepressants (Desipramine) Not Detected NDET^Not Detected ng/mL    Methadone (Methadone) Not Detected NDET^Not Detected ng/mL    Barbiturates (Butalbital) Not Detected NDET^Not Detected ng/mL    Oxycodone (Oxycodone) Not Detected NDET^Not Detected ng/mL    Propoxyphene (Norpropoxyphene) Not Detected NDET^Not Detected ng/mL    Buprenorphine (Buprenorphine) Not Detected NDET^Not Detected ng/mL       Medications   aspirin (ASA) chewable tablet 324 mg (324 mg Oral Given 10/18/19 0949)       Assessments & Plan (with Medical Decision Making)     I have reviewed the nursing notes.    I ha she is to follow-up with her primary care provider on Monday.  I did send a message to the primary care provider recommending cardiology evaluation.  She has any further symptoms she should return to the emergency room.  She is feeling better we will ambulate her to make sure she is asymptomatic.  Ve reviewed the findings, diagnosis, plan and need for follow up with the patient.   The patient has been asymptomatic since her ER visit.  No dysrhythmia noted on the monitor.  Currently medically stable.  Plan is to discharge her home but she will need a ride.  Postpone her mammogram.    New Prescriptions    No medications on file       Final diagnoses:   Palpitations       10/18/2019   [facility]     Jame Patton, DO  10/18/19 1101

## 2019-10-18 NOTE — ED AVS SNAPSHOT
[facility]  [address] [address]  Phone:  [phone]                                    uAra Carmona   MRN: [id number]    Department:  [facility]   Date of Visit:  10/18/2019           After Visit Summary Signature Page    I have received my discharge instructions, and my questions have been answered. I have discussed any challenges I see with this plan with the nurse or doctor.    ..........................................................................................................................................  Patient/Patient Representative Signature      ..........................................................................................................................................  Patient Representative Print Name and Relationship to Patient    ..................................................               ................................................  Date                                   Time    ..........................................................................................................................................  Reviewed by Signature/Title    ...................................................              ..............................................  Date                                               Time          22EPIC Rev 08/18

## 2019-10-18 NOTE — ED NOTES
Patient brought in to room. Rhythm showed SVT up into the 200's. Patient blew in syringe and bore down while IV was being started. No change in rhythm. Cold wash cloth to face, HR down to 170-190s. Ice pack placed to neck- converted 0844 SR 70-90s. Now HR SR 80's. Provider in to see patient.

## 2019-10-18 NOTE — ED TRIAGE NOTES
"Patient presents with complaints of \"heart palpations.  Just took her zio patch off today for a mammogram. Has been having fast and irregular heart beat x 10-15 minutes.  "

## 2019-10-21 NOTE — PROGRESS NOTES
Subjective     Aura Carmona is a 38 year old female who presents to clinic today for the following health issues:    HPI   ED/UC Followup:    Facility:  [facility]  Date of visit: 10/18/2019  Reason for visit: Palpitations   Current Status: still having intermittent palpitations      Aura presents today in consultation from Zunilda Matute NP due to palpitations.  Aura's TSH was normal and she did complete a Zio patch but shortly after taking the Zio patch off she experienced a significant episode of palpitations.  She present to the ED where her HRs were 180-215.  The rhythm abated after ice was applied to her neck.  She has had several episodes of this in the last few months.  No chest pain, no dizziness or pre syncope are reported.  Zio patch is still pending and EKG from ED visit has yet to be scanned into her chart.        Patient Active Problem List   Diagnosis     Family history of melanoma     Fibrocystic breast changes     Past Surgical History:   Procedure Laterality Date     APPENDECTOMY  2009    [place]     C/SECTION, CLASSICAL      x's 3      SECTION  11-24-15    laparotomy     CHOLECYSTECTOMY       GALLBLADDER SURGERY      [facility] teeth         Social History     Tobacco Use     Smoking status: Never Smoker     Smokeless tobacco: Never Used   Substance Use Topics     Alcohol use: Yes     Comment: occasional     Family History   Problem Relation Age of Onset     Hypertension Father      Diabetes Maternal Grandmother      Alzheimer Disease Maternal Grandfather      Heart Disease Paternal Grandmother      Heart Disease Paternal Grandfather          Current Outpatient Medications   Medication Sig Dispense Refill     metoprolol succinate ER (TOPROL-XL) 25 MG 24 hr tablet Take 1 tablet (25 mg) by mouth daily 30 tablet 1     No Known Allergies  BP Readings from Last 3 Encounters:   10/22/19 135/83   10/18/19 125/89   19 112/82    Wt Readings from Last 3  Encounters:   10/22/19 85.3 kg (188 lb)   09/30/19 85 kg (187 lb 8 oz)   07/16/18 85.2 kg (187 lb 12.8 oz)                  Reviewed and updated as needed this visit by Provider         Review of Systems   ROS COMP: Constitutional, HEENT, cardiovascular, pulmonary, gi and gu systems are negative, except as otherwise noted.      Objective    /83 (BP Location: Right arm, Patient Position: Chair, Cuff Size: Adult Regular)   Pulse 88   Temp 96.4  F (35.8  C) (Tympanic)   Wt 85.3 kg (188 lb)   SpO2 98%   BMI 30.34 kg/m    Body mass index is 30.34 kg/m .  Physical Exam   GENERAL: healthy, alert and no distress  RESP: lungs clear to auscultation - no rales, rhonchi or wheezes  CV: regular rate and rhythm, normal S1 S2, no S3 or S4, no murmur, click or rub, no peripheral edema and peripheral pulses strong  MS: no gross musculoskeletal defects noted, no edema  SKIN: no suspicious lesions or rashes  PSYCH: mentation appears normal, affect normal/bright    Diagnostic Test Results:  No results found for this or any previous visit (from the past 24 hour(s)).  Results for orders placed or performed during the hospital encounter of 10/18/19   XR Chest Port 1 View    Narrative    PROCEDURE:  XR CHEST PORT 1 VW    HISTORY:  PALPITATIONS.     COMPARISON:  None.    FINDINGS:   The cardiac silhouette is normal in size. The pulmonary vasculature is  normal.  The lungs are clear. No pleural effusion or pneumothorax.      Impression    IMPRESSION:  No acute cardiopulmonary disease.      LIZZY GRIGSBY MD   CBC with platelets differential   Result Value Ref Range    WBC 6.8 4.0 - 11.0 10e9/L    RBC Count 5.05 3.8 - 5.2 10e12/L    Hemoglobin 13.3 11.7 - 15.7 g/dL    Hematocrit 40.1 35.0 - 47.0 %    MCV 79 78 - 100 fl    MCH 26.3 (L) 26.5 - 33.0 pg    MCHC 33.2 31.5 - 36.5 g/dL    RDW 13.2 10.0 - 15.0 %    Platelet Count 270 150 - 450 10e9/L    Diff Method Automated Method     % Neutrophils 49.6 %    % Lymphocytes 37.2 %    %  Monocytes 8.9 %    % Eosinophils 2.9 %    % Basophils 1.0 %    % Immature Granulocytes 0.4 %    Nucleated RBCs 0 0 /100    Absolute Neutrophil 3.4 1.6 - 8.3 10e9/L    Absolute Lymphocytes 2.5 0.8 - 5.3 10e9/L    Absolute Monocytes 0.6 0.0 - 1.3 10e9/L    Absolute Eosinophils 0.2 0.0 - 0.7 10e9/L    Absolute Basophils 0.1 0.0 - 0.2 10e9/L    Abs Immature Granulocytes 0.0 0 - 0.4 10e9/L    Absolute Nucleated RBC 0.0    Troponin I   Result Value Ref Range    Troponin I ES <0.015 0.000 - 0.045 ug/L   Comprehensive metabolic panel   Result Value Ref Range    Sodium 138 133 - 144 mmol/L    Potassium 3.5 3.4 - 5.3 mmol/L    Chloride 108 94 - 109 mmol/L    Carbon Dioxide 24 20 - 32 mmol/L    Anion Gap 6 3 - 14 mmol/L    Glucose 77 70 - 99 mg/dL    Urea Nitrogen 8 7 - 30 mg/dL    Creatinine 0.70 0.52 - 1.04 mg/dL    GFR Estimate >90 >60 mL/min/[1.73_m2]    GFR Estimate If Black >90 >60 mL/min/[1.73_m2]    Calcium 8.8 8.5 - 10.1 mg/dL    Bilirubin Total 0.6 0.2 - 1.3 mg/dL    Albumin 4.3 3.4 - 5.0 g/dL    Protein Total 8.3 6.8 - 8.8 g/dL    Alkaline Phosphatase 60 40 - 150 U/L    ALT 23 0 - 50 U/L    AST 21 0 - 45 U/L   D-Dimer ([facility])   Result Value Ref Range    D-Dimer ng/mL 215 0 - 300 ng/ml D-DU   TSH   Result Value Ref Range    TSH 2.28 0.40 - 4.00 mU/L   UA with Microscopic reflex to Culture   Result Value Ref Range    Color Urine Straw     Appearance Urine Clear     Glucose Urine Negative NEG^Negative mg/dL    Bilirubin Urine Negative NEG^Negative    Ketones Urine Negative NEG^Negative mg/dL    Specific Gravity Urine 1.003 1.003 - 1.035    Blood Urine Negative NEG^Negative    pH Urine 7.0 4.7 - 8.0 pH    Protein Albumin Urine Negative NEG^Negative mg/dL    Urobilinogen mg/dL Normal 0.0 - 2.0 mg/dL    Nitrite Urine Negative NEG^Negative    Leukocyte Esterase Urine Negative NEG^Negative    Source Unspecified Urine     WBC Urine 1 0 - 5 /HPF    RBC Urine <1 0 - 2 /HPF    Bacteria Urine Few (A) NEG^Negative /HPF    Mucous  Urine Present (A) NEG^Negative /LPF   Urine Drugs of Abuse Screen Panel 13   Result Value Ref Range    Cannabinoids (97-adb-0-carboxy-9-THC) Not Detected NDET^Not Detected ng/mL    Phencyclidine (Phencyclidine) Not Detected NDET^Not Detected ng/mL    Cocaine (Benzoylecgonine) Not Detected NDET^Not Detected ng/mL    Methamphetamine (d-Methamphetamine) Not Detected NDET^Not Detected ng/mL    Opiates (Morphine) Not Detected NDET^Not Detected ng/mL    Amphetamine (d-Amphetamine) Not Detected NDET^Not Detected ng/mL    Benzodiazepines (Nordiazepam) Not Detected NDET^Not Detected ng/mL    Tricyclic Antidepressants (Desipramine) Not Detected NDET^Not Detected ng/mL    Methadone (Methadone) Not Detected NDET^Not Detected ng/mL    Barbiturates (Butalbital) Not Detected NDET^Not Detected ng/mL    Oxycodone (Oxycodone) Not Detected NDET^Not Detected ng/mL    Propoxyphene (Norpropoxyphene) Not Detected NDET^Not Detected ng/mL    Buprenorphine (Buprenorphine) Not Detected NDET^Not Detected ng/mL           Assessment & Plan   Problem List Items Addressed This Visit     None      Visit Diagnoses     Paroxysmal supraventricular tachycardia (H)    -  Primary    Relevant Medications    metoprolol succinate ER (TOPROL-XL) 25 MG 24 hr tablet    Other Relevant Orders    Echocardiogram Complete    CARDIOLOGY EVAL ADULT REFERRAL           Timmy Yan, DO  [facility]

## 2019-10-22 ENCOUNTER — OFFICE VISIT (OUTPATIENT)
Dept: INTERNAL MEDICINE | Facility: OTHER | Age: 39
End: 2019-10-22
Attending: NURSE PRACTITIONER
Payer: COMMERCIAL

## 2019-10-22 VITALS
SYSTOLIC BLOOD PRESSURE: 135 MMHG | OXYGEN SATURATION: 98 % | WEIGHT: 188 LBS | TEMPERATURE: 96.4 F | DIASTOLIC BLOOD PRESSURE: 83 MMHG | HEART RATE: 88 BPM | BODY MASS INDEX: 30.34 KG/M2

## 2019-10-22 DIAGNOSIS — I47.10 PAROXYSMAL SUPRAVENTRICULAR TACHYCARDIA (H): Primary | ICD-10-CM

## 2019-10-22 PROCEDURE — 99244 OFF/OP CNSLTJ NEW/EST MOD 40: CPT | Performed by: INTERNAL MEDICINE

## 2019-10-22 RX ORDER — METOPROLOL SUCCINATE 25 MG/1
25 TABLET, EXTENDED RELEASE ORAL DAILY
Qty: 30 TABLET | Refills: 1 | Status: SHIPPED | OUTPATIENT
Start: 2019-10-22 | End: 2019-12-20

## 2019-10-22 ASSESSMENT — PAIN SCALES - GENERAL: PAINLEVEL: NO PAIN (0)

## 2019-10-22 NOTE — NURSING NOTE
"Chief Complaint   Patient presents with     Heart Problem       Initial /83 (BP Location: Right arm, Patient Position: Chair, Cuff Size: Adult Regular)   Pulse 88   Temp 96.4  F (35.8  C) (Tympanic)   Wt 85.3 kg (188 lb)   SpO2 98%   BMI 30.34 kg/m   Estimated body mass index is 30.34 kg/m  as calculated from the following:    Height as of 7/16/18: 1.676 m (5' 6\").    Weight as of this encounter: 85.3 kg (188 lb).  Medication Reconciliation: complete  JOHNSON CASAREZ LPN  "

## 2019-10-25 ENCOUNTER — ANCILLARY PROCEDURE (OUTPATIENT)
Dept: MAMMOGRAPHY | Facility: OTHER | Age: 39
End: 2019-10-25
Attending: NURSE PRACTITIONER
Payer: COMMERCIAL

## 2019-10-25 PROCEDURE — 77065 DX MAMMO INCL CAD UNI: CPT | Mod: TC

## 2019-10-25 PROCEDURE — G0279 TOMOSYNTHESIS, MAMMO: HCPCS | Mod: TC

## 2019-11-01 ENCOUNTER — HOSPITAL ENCOUNTER (OUTPATIENT)
Dept: CARDIOLOGY | Facility: HOSPITAL | Age: 39
Discharge: HOME | End: 2019-11-01
Attending: INTERNAL MEDICINE | Admitting: INTERNAL MEDICINE
Payer: COMMERCIAL

## 2019-11-01 DIAGNOSIS — I47.10 PAROXYSMAL SUPRAVENTRICULAR TACHYCARDIA (H): ICD-10-CM

## 2019-11-01 PROCEDURE — 93306 TTE W/DOPPLER COMPLETE: CPT | Mod: 26 | Performed by: INTERNAL MEDICINE

## 2019-11-01 PROCEDURE — 93306 TTE W/DOPPLER COMPLETE: CPT | Mod: TC

## 2019-11-12 NOTE — PROGRESS NOTES
Subjective     Aura Carmona is a 39 year old female who presents to clinic today for the following health issues:    HPI   Anxiety Follow-Up    How are you doing with your anxiety since your last visit? Worsened     Are you having other symptoms that might be associated with anxiety? Yes:  racing heart, shortness of breath, flight or fight and shortness of breath.  Currently being treated with metoprolol succinate ER for paroxysmal supraventricular tachycardia    Have you had a significant life event? Job Concerns     Are you feeling depressed? No    Do you have any concerns with your use of alcohol or other drugs? No    There are many job stability related issues going on with her employer, unrelated to her personal job performance. 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   Historically, she took Zoloft many years ago for anxiety for about one year and reportedly did improve her symptoms. She denies any adverse reactions to this medication an believes she got off of it because she did not need it and weaned herself off.     Denies any thoughts of harm to self or others, any past attempts of harm, or concerns for safety.       Social History     Tobacco Use     Smoking status: Never Smoker     Smokeless tobacco: Never Used   Substance Use Topics     Alcohol use: Yes     Comment: occasional     Drug use: No     LUZ-7 SCORE 7/16/2018 9/30/2019 11/14/2019   Total Score 0 0 9     PHQ 7/16/2018 9/30/2019 11/14/2019   PHQ-9 Total Score 0 0 2   Q9: Thoughts of better off dead/self-harm past 2 weeks Not at all Not at all Not at all       How many servings of fruits and vegetables do you eat daily?  2-3    On average, how many sweetened beverages do you drink each day (soda, juice, sweet tea, etc)?   1    How many days per week do you miss taking your medication? 0        Patient  "Active Problem List   Diagnosis     Family history of melanoma     Fibrocystic breast changes     Past Surgical History:   Procedure Laterality Date     APPENDECTOMY  2009    [place]     C/SECTION, CLASSICAL      x's 3      SECTION  11-24-15    laparotomy     CHOLECYSTECTOMY       GALLBLADDER SURGERY      [facility]     wisdom teeth         Social History     Tobacco Use     Smoking status: Never Smoker     Smokeless tobacco: Never Used   Substance Use Topics     Alcohol use: Yes     Comment: occasional     Family History   Problem Relation Age of Onset     Hypertension Father      Diabetes Maternal Grandmother      Alzheimer Disease Maternal Grandfather      Heart Disease Paternal Grandmother      Heart Disease Paternal Grandfather          Current Outpatient Medications   Medication Sig Dispense Refill     sertraline (ZOLOFT) 25 MG tablet Take 1 tablet (25 mg) by mouth daily 30 tablet 0     metoprolol succinate ER (TOPROL-XL) 25 MG 24 hr tablet Take 1 tablet (25 mg) by mouth daily 30 tablet 1     No Known Allergies  Recent Labs   Lab Test 10/18/19  0844 19  1558   A1C  --  5.1   ALT 23 15   CR 0.70 0.72   GFRESTIMATED >90 >90   GFRESTBLACK >90 >90   POTASSIUM 3.5 4.0   TSH 2.28 1.68      BP Readings from Last 3 Encounters:   19 122/78   10/22/19 135/83   10/18/19 125/89    Wt Readings from Last 3 Encounters:   19 84.8 kg (187 lb)   10/22/19 85.3 kg (188 lb)   19 85 kg (187 lb 8 oz)           Reviewed and updated as needed this visit by Provider         Review of Systems   ROS COMP: Constitutional, HEENT, cardiovascular, pulmonary, gi and gu systems are negative, except as otherwise noted.      Objective    /78 (BP Location: Right arm, Patient Position: Sitting, Cuff Size: Adult Regular)   Pulse 96   Temp 97.6  F (36.4  C) (Tympanic)   Resp 14   Ht 1.676 m (5' 6\")   Wt 84.8 kg (187 lb)   SpO2 98%   BMI 30.18 kg/m    Body mass index is 30.18 kg/m . " "        Physical Exam   GENERAL: healthy, alert and no distress  EYES: Eyes grossly normal to inspection, PERRL and conjunctivae and sclerae normal  RESP: lungs clear to auscultation - no rales, rhonchi or wheezes  CV: regular rate and rhythm, normal S1 S2, no S3 or S4, no murmur, click or rub, no peripheral edema and peripheral pulses strong        Assessment & Plan   1. Anxiety  - sertraline (ZOLOFT) 25 MG tablet; Take 1 tablet (25 mg) by mouth daily  Dispense: 30 tablet; Refill: 0  - MENTAL HEALTH REFERRAL  - Adult; Outpatient Treatment; Individual/Couples/Family/Group Therapy/Health Psychology; Range: Counseling [facility] [phone]; We will contact you to schedule the appointment or please call ...    -If you become nauseated you may try B12, daisy, or backing off to 1/2 tab every day for a week and bumping back up to 25 mg daily. Discussed with patient that symptoms are often better after one week.     -Discussed with patient that she may opt to start vitamin D3 over the counter 1,000-2,000 international unit(s) per day. We discussed the option to screen for actual deficiency but she opted to not due this and start on the D3 during the winter months.         BMI:   Estimated body mass index is 30.18 kg/m  as calculated from the following:    Height as of this encounter: 1.676 m (5' 6\").    Weight as of this encounter: 84.8 kg (187 lb).   Weight management plan: Discussed healthy diet and exercise guidelines encouraged increaseing protein and decreasing carbs as well as keeping track of calories.    Return in about 4 weeks (around 12/12/2019).    Riddhi Hoang, [facility]      "

## 2019-11-14 ENCOUNTER — OFFICE VISIT (OUTPATIENT)
Dept: FAMILY MEDICINE | Facility: OTHER | Age: 39
End: 2019-11-14
Attending: NURSE PRACTITIONER
Payer: COMMERCIAL

## 2019-11-14 VITALS
OXYGEN SATURATION: 98 % | HEIGHT: 66 IN | WEIGHT: 187 LBS | TEMPERATURE: 97.6 F | RESPIRATION RATE: 14 BRPM | HEART RATE: 96 BPM | SYSTOLIC BLOOD PRESSURE: 122 MMHG | BODY MASS INDEX: 30.05 KG/M2 | DIASTOLIC BLOOD PRESSURE: 78 MMHG

## 2019-11-14 DIAGNOSIS — F41.9 ANXIETY: Primary | ICD-10-CM

## 2019-11-14 PROCEDURE — 99213 OFFICE O/P EST LOW 20 MIN: CPT | Performed by: NURSE PRACTITIONER

## 2019-11-14 RX ORDER — SERTRALINE HYDROCHLORIDE 25 MG/1
25 TABLET, FILM COATED ORAL DAILY
Qty: 30 TABLET | Refills: 0 | Status: SHIPPED | OUTPATIENT
Start: 2019-11-14 | End: 2020-01-17

## 2019-11-14 ASSESSMENT — ANXIETY QUESTIONNAIRES
1. FEELING NERVOUS, ANXIOUS, OR ON EDGE: MORE THAN HALF THE DAYS
7. FEELING AFRAID AS IF SOMETHING AWFUL MIGHT HAPPEN: SEVERAL DAYS
GAD7 TOTAL SCORE: 9
6. BECOMING EASILY ANNOYED OR IRRITABLE: SEVERAL DAYS
3. WORRYING TOO MUCH ABOUT DIFFERENT THINGS: MORE THAN HALF THE DAYS
2. NOT BEING ABLE TO STOP OR CONTROL WORRYING: MORE THAN HALF THE DAYS
5. BEING SO RESTLESS THAT IT IS HARD TO SIT STILL: NOT AT ALL

## 2019-11-14 ASSESSMENT — PAIN SCALES - GENERAL: PAINLEVEL: NO PAIN (0)

## 2019-11-14 ASSESSMENT — PATIENT HEALTH QUESTIONNAIRE - PHQ9
SUM OF ALL RESPONSES TO PHQ QUESTIONS 1-9: 2
5. POOR APPETITE OR OVEREATING: SEVERAL DAYS

## 2019-11-14 ASSESSMENT — MIFFLIN-ST. JEOR: SCORE: 1549.98

## 2019-11-14 NOTE — NURSING NOTE
"Chief Complaint   Patient presents with     Anxiety       Initial /78 (BP Location: Right arm, Patient Position: Sitting, Cuff Size: Adult Regular)   Pulse 96   Temp 97.6  F (36.4  C) (Tympanic)   Resp 14   Ht 1.676 m (5' 6\")   Wt 84.8 kg (187 lb)   SpO2 98%   BMI 30.18 kg/m   Estimated body mass index is 30.18 kg/m  as calculated from the following:    Height as of this encounter: 1.676 m (5' 6\").    Weight as of this encounter: 84.8 kg (187 lb).  Medication Reconciliation: complete  Taryn Young LPN    "

## 2019-11-14 NOTE — PATIENT INSTRUCTIONS
1. Anxiety  - sertraline (ZOLOFT) 25 MG tablet; Take 1 tablet (25 mg) by mouth daily  Dispense: 30 tablet; Refill: 0  -If you become nauseated you may try B12, daisy, or backing off to 1/2 tab every day for a week and bumping back up to 25 mg daily.    - Mental health referral

## 2019-11-15 ASSESSMENT — ANXIETY QUESTIONNAIRES: GAD7 TOTAL SCORE: 9

## 2019-12-03 ENCOUNTER — TELEPHONE (OUTPATIENT)
Dept: CARDIOLOGY | Facility: OTHER | Age: 39
End: 2019-12-03

## 2019-12-03 NOTE — TELEPHONE ENCOUNTER
Patient cancelled her new cardiology consult with Dr. Whipple on 12/2/2019.  Patient is rescheduled in [facility] for 1/17/2020 with Dr. Whipple.

## 2019-12-04 NOTE — PROGRESS NOTES
Subjective     Aura Carmona is a 39 year old female who presents to clinic today for the following health issues:    HPI   Depression and Anxiety Follow-Up    How are you doing with your depression since your last visit? Improved     How are you doing with your anxiety since your last visit?  worsened for first week or two but has improved over last week. She feels the medication is starting to work.    Are you having other symptoms that might be associated with depression or anxiety? No    Have you had a significant life event? No     Do you have any concerns with your use of alcohol or other drugs? No      PHQ-9 SCORE 2019   PHQ-9 Total Score 2 1 1     LUZ-7 SCORE 2019   Total Score 9 0 1       Social History     Tobacco Use     Smoking status: Never Smoker     Smokeless tobacco: Never Used   Substance Use Topics     Alcohol use: Yes     Comment: occasional     Drug use: No     PHQ 2019   PHQ-9 Total Score 2 1 1   Q9: Thoughts of better off dead/self-harm past 2 weeks Not at all Not at all Not at all     LUZ-7 SCORE 2019   Total Score 9 0 1       In the past two weeks have you had thoughts of suicide or self-harm?  No.    Do you have concerns about your personal safety or the safety of others?   No    Suicide Assessment Five-step Evaluation and Treatment (SAFE-T)      How many servings of fruits and vegetables do you eat daily?  2-3    On average, how many sweetened beverages do you drink each day (Examples: soda, juice, sweet tea, etc.  Do NOT count diet or artificially sweetened beverages)?   0    How many days per week do you miss taking your medication? 0    Patient Active Problem List   Diagnosis     Family history of melanoma     Fibrocystic breast changes     Past Surgical History:   Procedure Laterality Date     APPENDECTOMY      [place]     C/SECTION, CLASSICAL      x's 3       SECTION  11-24-15    laparotomy     CHOLECYSTECTOMY       [facility]ALLBL[facility]         Social History     Tobacco Use     Smoking status: Never Smoker     Smokeless tobacco: Never Used   Substance Use Topics     Alcohol use: Yes     Comment: occasional     Family History   Problem Relation Age of Onset     Hypertension Father      Diabetes Maternal Grandmother      Alzheimer Disease Maternal Grandfather      Heart Disease Paternal Grandmother      Heart Disease Paternal Grandfather          Current Outpatient Medications   Medication Sig Dispense Refill     CHOLECALCIFEROL PO Take 2,000 Units by mouth       magnesium oxide 400 MG CAPS        metoprolol succinate ER (TOPROL-XL) 25 MG 24 hr tablet Take 1 tablet (25 mg) by mouth daily 30 tablet 1     multivitamin w/minerals (MULTI-VITAMIN) tablet Take 1 tablet by mouth daily       sertraline (ZOLOFT) 25 MG tablet Take 1 tablet (25 mg) by mouth daily 30 tablet 0     sertraline (ZOLOFT) 50 MG tablet Take 0.5-1 tablets (25-50 mg) by mouth daily Continue at 25 mg (1/2 tab). May increase after 2-4 more weeks as tolerated. 30 tablet 3     No Known Allergies  Recent Labs   Lab Test 10/18/19  0844 09/30/19  1558   A1C  --  5.1   ALT 23 15   CR 0.70 0.72   GFRESTIMATED >90 >90   GFRESTBLACK >90 >90   POTASSIUM 3.5 4.0   TSH 2.28 1.68      BP Readings from Last 3 Encounters:   12/11/19 114/74   11/14/19 122/78   10/22/19 135/83    Wt Readings from Last 3 Encounters:   12/11/19 84.8 kg (187 lb)   11/14/19 84.8 kg (187 lb)   10/22/19 85.3 kg (188 lb)              Reviewed and updated as needed this visit by Provider         Review of Systems   ROS COMP: Constitutional, HEENT, cardiovascular, pulmonary, gi and gu systems are negative, except as otherwise noted.      Objective    /74 (BP Location: Right arm, Patient Position: Sitting, Cuff Size: Adult Large)   Pulse 64   Temp 96.4  F (35.8  C) (Tympanic)   Resp 14   Ht 1.676 m (5'  "6\")   Wt 84.8 kg (187 lb)   SpO2 98%   BMI 30.18 kg/m    Body mass index is 30.18 kg/m .  Physical Exam   GENERAL: healthy, alert and no distress  EYES: Eyes grossly normal to inspection and conjunctivae and sclerae normal  RESP: lungs clear to auscultation - no rales, rhonchi or wheezes  CV: regular rate and rhythm, normal S1 S2, no S3 or S4, no murmur, click or rub, no peripheral edema   PSYCH: mentation appears normal, affect normal/bright          Assessment & Plan   1. Migue Chavarria initially had some increased anxiety but has been feeling much better now. She feels the medication is starting to work. I will continue her on this medication and, knowing that a dose increase may be needed for optimal effects, I have educated her on increasing from 25 mg to 50 mg during the next month or so if she is still experiencing anxiety at an unsatisfactory level. She is aware that no medication can take away all feelings of anxiety.    - sertraline (ZOLOFT) 50 MG tablet; Take 0.5-1 tablets (25-50 mg) by mouth daily Continue at 25 mg (1/2 tab). May increase after 2-4 more weeks as tolerated.  Dispense: 30 tablet; Refill: 3      Return in about 3 months (around 3/11/2020), or if symptoms worsen or fail to improve.    Riddhi Hoang, [facility]      "

## 2019-12-11 ENCOUNTER — OFFICE VISIT (OUTPATIENT)
Dept: FAMILY MEDICINE | Facility: OTHER | Age: 39
End: 2019-12-11
Attending: NURSE PRACTITIONER
Payer: COMMERCIAL

## 2019-12-11 VITALS
TEMPERATURE: 96.4 F | DIASTOLIC BLOOD PRESSURE: 74 MMHG | OXYGEN SATURATION: 98 % | HEART RATE: 64 BPM | HEIGHT: 66 IN | WEIGHT: 187 LBS | BODY MASS INDEX: 30.05 KG/M2 | SYSTOLIC BLOOD PRESSURE: 114 MMHG | RESPIRATION RATE: 14 BRPM

## 2019-12-11 DIAGNOSIS — F41.9 ANXIETY: Primary | ICD-10-CM

## 2019-12-11 PROCEDURE — 99213 OFFICE O/P EST LOW 20 MIN: CPT | Performed by: NURSE PRACTITIONER

## 2019-12-11 RX ORDER — CALCIUM CARBONATE/VITAMIN D3 500-10/5ML
LIQUID (ML) ORAL
COMMUNITY
End: 2022-02-25

## 2019-12-11 RX ORDER — MULTIPLE VITAMINS W/ MINERALS TAB 9MG-400MCG
1 TAB ORAL DAILY
COMMUNITY

## 2019-12-11 ASSESSMENT — ANXIETY QUESTIONNAIRES
3. WORRYING TOO MUCH ABOUT DIFFERENT THINGS: NOT AT ALL
2. NOT BEING ABLE TO STOP OR CONTROL WORRYING: NOT AT ALL
3. WORRYING TOO MUCH ABOUT DIFFERENT THINGS: NOT AT ALL
6. BECOMING EASILY ANNOYED OR IRRITABLE: NOT AT ALL
1. FEELING NERVOUS, ANXIOUS, OR ON EDGE: NOT AT ALL
1. FEELING NERVOUS, ANXIOUS, OR ON EDGE: SEVERAL DAYS
7. FEELING AFRAID AS IF SOMETHING AWFUL MIGHT HAPPEN: NOT AT ALL
6. BECOMING EASILY ANNOYED OR IRRITABLE: NOT AT ALL
5. BEING SO RESTLESS THAT IT IS HARD TO SIT STILL: NOT AT ALL
GAD7 TOTAL SCORE: 0
GAD7 TOTAL SCORE: 1
7. FEELING AFRAID AS IF SOMETHING AWFUL MIGHT HAPPEN: NOT AT ALL
2. NOT BEING ABLE TO STOP OR CONTROL WORRYING: NOT AT ALL
5. BEING SO RESTLESS THAT IT IS HARD TO SIT STILL: NOT AT ALL

## 2019-12-11 ASSESSMENT — PATIENT HEALTH QUESTIONNAIRE - PHQ9
5. POOR APPETITE OR OVEREATING: NOT AT ALL
SUM OF ALL RESPONSES TO PHQ QUESTIONS 1-9: 1
5. POOR APPETITE OR OVEREATING: NOT AT ALL

## 2019-12-11 ASSESSMENT — PAIN SCALES - GENERAL: PAINLEVEL: NO PAIN (0)

## 2019-12-11 ASSESSMENT — MIFFLIN-ST. JEOR: SCORE: 1549.98

## 2019-12-11 NOTE — NURSING NOTE
"Chief Complaint   Patient presents with     Anxiety       Initial /74 (BP Location: Right arm, Patient Position: Sitting, Cuff Size: Adult Large)   Pulse 64   Temp 96.4  F (35.8  C) (Tympanic)   Resp 14   Ht 1.676 m (5' 6\")   Wt 84.8 kg (187 lb)   SpO2 98%   BMI 30.18 kg/m   Estimated body mass index is 30.18 kg/m  as calculated from the following:    Height as of this encounter: 1.676 m (5' 6\").    Weight as of this encounter: 84.8 kg (187 lb).  Medication Reconciliation: complete  Taryn Young LPN    "

## 2019-12-11 NOTE — PATIENT INSTRUCTIONS
1. Anxiety    Aura initially had some increased anxiety but has been feeling much better now. She feels the medication is starting to work. I will continue her on this medication and, knowing that a dose increase may be needed for optimal effects, have educated her on increasing from 25 mg to 50 mg.     - sertraline (ZOLOFT) 50 MG tablet; Take 0.5-1 tablets (25-50 mg) by mouth daily Continue at 25 mg (1/2 tab). May increase after 2-4 more weeks as tolerated.  Dispense: 30 tablet; Refill: 3

## 2019-12-12 ASSESSMENT — ANXIETY QUESTIONNAIRES: GAD7 TOTAL SCORE: 1

## 2019-12-20 ENCOUNTER — TELEPHONE (OUTPATIENT)
Dept: FAMILY MEDICINE | Facility: OTHER | Age: 39
End: 2019-12-20

## 2019-12-20 DIAGNOSIS — I47.10 PAROXYSMAL SUPRAVENTRICULAR TACHYCARDIA (H): ICD-10-CM

## 2019-12-20 RX ORDER — METOPROLOL SUCCINATE 25 MG/1
25 TABLET, EXTENDED RELEASE ORAL DAILY
Qty: 30 TABLET | Refills: 0 | Status: SHIPPED | OUTPATIENT
Start: 2019-12-20 | End: 2020-01-17

## 2020-01-16 PROBLEM — I49.1 PAC (PREMATURE ATRIAL CONTRACTION): Status: ACTIVE | Noted: 2020-01-16

## 2020-01-16 PROBLEM — I49.3 PVC'S (PREMATURE VENTRICULAR CONTRACTIONS): Status: ACTIVE | Noted: 2020-01-16

## 2020-01-16 PROBLEM — R00.2 PALPITATIONS: Status: ACTIVE | Noted: 2020-01-16

## 2020-01-16 PROBLEM — I47.10 SVT (SUPRAVENTRICULAR TACHYCARDIA) (H): Status: ACTIVE | Noted: 2020-01-16

## 2020-01-16 NOTE — PROGRESS NOTES
[facility] HEART CARE   CARDIOLOGY CONSULT     Aura Carmona   1980  [id number]    Zunilda Matute     Chief Complaint   Patient presents with     New Patient          HPI:   Mrs. Carmona is a 39-year-old female who is being seen by cardiology for presumptive AVNRT.  She has had x2 episodes of significant palpitations with documentation of the SVT on 10/18/2019.    She reports x2 episodes of extended runs of tachycardia.  She was seen in the ER with documentation on EKG showing a heart rate of 215 bpm.  Her symptoms lasted for approximately 30 minutes.  She was able to apply ice to her neck and she cardioverted spontaneously to a rate of 100 bpm.  She has had x3 pregnancies without significant issues of his dysrhythmia.  Approximately, 2 to 3 weeks prior to this episode, she had another episode lasting less than 30 minutes.  She is uncertain of the duration.  With these episodes, she denies lightheadedness, dizziness, near-syncope, or syncope.  Obviously, she did feel her heart palpitating but did not describe her symptoms as being severely disturbing.  She has history of anxiety with panic attacks.  She and her family are concerned that her symptoms may be related to her panic attacks.  She has been on metoprolol with improvement of her symptoms.    She was set up with a ZIO Patch which was completed on 10/11/2019.  Her heart rate ranged between  bpm, averaging 76 bpm.  She had rare PAC's and PVC's.  There was no SVT or PSVT identified.    She had an echo on 11/1/2019.  Essentially, this study was normal.  Her EF was EF of 60 to 65%, normal diastolic function, trace MR, TR, PI with normal atria.  RVSP was 15.    IMAGING RESULTS:   Echo on 11/1/2019:  No pericardial effusion is present.  Global and regional left ventricular function is normal with an EF of 60-65%.  Left ventricular diastolic function is normal.  The right ventricle is normal size.  Both atria appear normal.  Trace mitral insufficiency  is present.  Aortic valve is normal in structure and function.  The aortic valve is tricuspid.  Trace tricuspid insufficiency is present.  Right ventricular systolic pressure is 15 mmHg above the right atrial pressure.  Trace pulmonic insufficiency is present.  The aorta root is normal.    ZIO Patch on 10/11/2019:  The enrollment period was from 10/11/2019 through 10/18/2019.  There were 6 days and 17 hours of analysis time available for review.  The minimum heart rate was 52, average heart rate 76 and maximum heart rate 155 bpm.  Patient has underlying sinus rhythm.  There is no evidence of significant bradycardia, pauses or heart block seen.  There are rare PAC's present.  No SVT is seen.  Rare PVC's are present.  No ventricular tachycardia, bigeminy or trigeminy are present.  The patient had 2 triggered events both which had sinus rhythm. One had an isolated PAC.    ALLERGIES:   No Known Allergies     PAST MEDICAL HISTORY:   Past Medical History:   Diagnosis Date     Fibrocystic breast changes 2018        PAST SURGICAL HISTORY:   Past Surgical History:   Procedure Laterality Date     APPENDECTOMY  2009    [place]     C/SECTION, CLASSICAL      x's 3      SECTION  11-24-15    laparotomy     CHOLECYSTECTOMY       GALLBLADDER SURGERY      [facility]     wisdom teeth          FAMILY HISTORY:   Family History   Problem Relation Age of Onset     Hypertension Father      Diabetes Maternal Grandmother      Alzheimer Disease Maternal Grandfather      Heart Disease Paternal Grandmother      Heart Disease Paternal Grandfather         SOCIAL HISTORY:   Social History     Socioeconomic History     Marital status:      Spouse name: Not on file     Number of children: Not on file     Years of education: Not on file     Highest education level: Not on file   Occupational History     Not on file   Social Needs     Financial resource strain: Not on file     Food insecurity:     Worry: Not on file      Inability: Not on file     Transportation needs:     Medical: Not on file     Non-medical: Not on file   Tobacco Use     Smoking status: Never Smoker     Smokeless tobacco: Never Used   Substance and Sexual Activity     Alcohol use: Yes     Comment: occasional     Drug use: No     Sexual activity: Yes     Partners: Male   Lifestyle     Physical activity:     Days per week: Not on file     Minutes per session: Not on file     Stress: Not on file   Relationships     Social connections:     Talks on phone: Not on file     Gets together: Not on file     Attends Latter day service: Not on file     Active member of club or organization: Not on file     Attends meetings of clubs or organizations: Not on file     Relationship status: Not on file     Intimate partner violence:     Fear of current or ex partner: Not on file     Emotionally abused: Not on file     Physically abused: Not on file     Forced sexual activity: Not on file   Other Topics Concern     Parent/sibling w/ CABG, MI or angioplasty before 65F 55M? Not Asked   Social History Narrative     Not on file         CURRENT MEDICATIONS:   Prior to Admission medications    Medication Sig Start Date End Date Taking? Authorizing Provider   CHOLECALCIFEROL PO Take 2,000 Units by mouth    Reported, Patient   magnesium oxide 400 MG CAPS     Reported, Patient   metoprolol succinate ER (TOPROL-XL) 25 MG 24 hr tablet Take 1 tablet (25 mg) by mouth daily 12/20/19   Zunilda Matute CNP   multivitamin w/minerals (MULTI-VITAMIN) tablet Take 1 tablet by mouth daily    Reported, Patient   sertraline (ZOLOFT) 25 MG tablet Take 1 tablet (25 mg) by mouth daily 11/14/19 12/14/19  Riddhi Hoang CNP   sertraline (ZOLOFT) 50 MG tablet Take 0.5-1 tablets (25-50 mg) by mouth daily Continue at 25 mg (1/2 tab). May increase after 2-4 more weeks as tolerated. 12/11/19 4/9/20  Riddhi Hoang CNP          ROS:   CONSTITUTIONAL: No weight loss, fever, chills, weakness or fatigue.   HEENT:  Eyes: No visual changes. Ears, Nose, Throat: No hearing loss, congestion or difficulty swallowing.   CARDIOVASCULAR: No chest pain, chest pressure or chest discomfort.  Occasional palpitations but no lower extremity edema.   RESPIRATORY: No shortness of breath, dyspnea upon exertion, cough or sputum production.   GASTROINTESTINAL: No abdominal pain. No anorexia, nausea, vomiting or diarrhea.   NEUROLOGICAL: No headache, lightheadedness, dizziness, syncope, ataxia or weakness.   HEMATOLOGIC: No anemia, bleeding or bruising.   PSYCHIATRIC: No history of depression or anxiety.   ENDOCRINOLOGIC: No reports of sweating, cold or heat intolerance. No polyuria or polydipsia.   SKIN: No abnormal rashes or itching.       PHYSICAL EXAM:   GENERAL: The patient is a well-developed, well-nourished, in no apparent distress. Alert and oriented x3.   HEENT: Head is normocephalic and atraumatic. Eyes are symmetrical with normal visual tracking.  HEART: Regular rate and rhythm, S1S2 present without murmur, rub or gallop.   LUNGS: Respirations regular and unlabored. Clear to auscultation.   GI: Abdomen is soft and non distended.   EXTREMITIES: No peripheral edema present.   MUSCULOSKELETAL: No joint swelling.   NEUROLOGIC: Alert and oriented X3.    SKIN: No jaundice. No rashes or visible skin lesions present.     LAB RESULTS:   No visits with results within 2 Month(s) from this visit.   Latest known visit with results is:   Office Visit on 09/30/2019   Component Date Value Ref Range Status     TSH 09/30/2019 1.68  0.40 - 4.00 mU/L Final     Sodium 09/30/2019 139  133 - 144 mmol/L Final     Potassium 09/30/2019 4.0  3.4 - 5.3 mmol/L Final     Chloride 09/30/2019 107  94 - 109 mmol/L Final     Carbon Dioxide 09/30/2019 24  20 - 32 mmol/L Final     Anion Gap 09/30/2019 8  3 - 14 mmol/L Final     Glucose 09/30/2019 83  70 - 99 mg/dL Final     Urea Nitrogen 09/30/2019 12  7 - 30 mg/dL Final     Creatinine 09/30/2019 0.72  0.52 - 1.04 mg/dL  Final     GFR Estimate 09/30/2019 >90  >60 mL/min/[1.73_m2] Final     GFR Estimate If Black 09/30/2019 >90  >60 mL/min/[1.73_m2] Final     Calcium 09/30/2019 8.8  8.5 - 10.1 mg/dL Final     Bilirubin Total 09/30/2019 0.5  0.2 - 1.3 mg/dL Final     Albumin 09/30/2019 4.2  3.4 - 5.0 g/dL Final     Protein Total 09/30/2019 8.1  6.8 - 8.8 g/dL Final     Alkaline Phosphatase 09/30/2019 49  40 - 150 U/L Final     ALT 09/30/2019 15  0 - 50 U/L Final     AST 09/30/2019 15  0 - 45 U/L Final     WBC 09/30/2019 6.8  4.0 - 11.0 10e9/L Final     RBC Count 09/30/2019 4.74  3.8 - 5.2 10e12/L Final     Hemoglobin 09/30/2019 12.6  11.7 - 15.7 g/dL Final     Hematocrit 09/30/2019 38.8  35.0 - 47.0 % Final     MCV 09/30/2019 82  78 - 100 fl Final     MCH 09/30/2019 26.6  26.5 - 33.0 pg Final     MCHC 09/30/2019 32.5  31.5 - 36.5 g/dL Final     RDW 09/30/2019 13.3  10.0 - 15.0 % Final     Platelet Count 09/30/2019 261  150 - 450 10e9/L Final     % Neutrophils 09/30/2019 59.3  % Final     % Lymphocytes 09/30/2019 31.6  % Final     % Monocytes 09/30/2019 6.0  % Final     % Eosinophils 09/30/2019 2.5  % Final     % Basophils 09/30/2019 0.6  % Final     Absolute Neutrophil 09/30/2019 4.0  1.6 - 8.3 10e9/L Final     Absolute Lymphocytes 09/30/2019 2.2  0.8 - 5.3 10e9/L Final     Absolute Monocytes 09/30/2019 0.4  0.0 - 1.3 10e9/L Final     Absolute Eosinophils 09/30/2019 0.2  0.0 - 0.7 10e9/L Final     Absolute Basophils 09/30/2019 0.0  0.0 - 0.2 10e9/L Final     Diff Method 09/30/2019 Automated Method   Final     Hemoglobin A1C 09/30/2019 5.1  0 - 5.6 % Final     Estimated Average Glucose 09/30/2019 100  mg/dL Final            ASSESSMENT:       ICD-10-CM    1. Anxiety F41.9    2. SVT (supraventricular tachycardia) (H) I47.1    3. Paroxysmal supraventricular tachycardia (H) I47.1 metoprolol succinate ER (TOPROL-XL) 25 MG 24 hr tablet   4. Palpitations R00.2    5. PAC (premature atrial contraction) I49.1    6. PVC's (premature ventricular  contractions) I49.3    7. History of panic attacks Z86.59          PLAN:   1.  We discussed medications, Valsalva maneuver, and the possibility for and an ablation.  She continues to have mild symptoms improved with metoprolol.  She has had x2 episodes of significant symptoms/dysrhythmia.  At this point, she plans to discuss her options with her  as well as other friends and family.  She has chosen to continue metoprolol with consideration for referral to see EP in the future.  2.  If she chooses to see EP in the future, referral will be placed for telemedicine.  3.  Refill metoprolol 25 mg XL daily.  4.  We reviewed her echo from 11/1/2019, ZIO Patch from 10/11/2019, and Holter from 3/7/2014.  We also reviewed her EKG showing presumably AVNRT from 10/18/2019 which was her ER visit.  5.  Follow-up will be contingent on her future decision/need for escalation of care.  As of now, follow-up in the future on as-needed basis.      Thank you for allowing me to participate in the care of your patient. Please do not hesitate to contact me if you have any questions.     Richard Whipple, DO

## 2020-01-17 ENCOUNTER — TELEPHONE (OUTPATIENT)
Dept: CARDIOLOGY | Facility: OTHER | Age: 40
End: 2020-01-17
Payer: COMMERCIAL

## 2020-01-17 ENCOUNTER — OFFICE VISIT (OUTPATIENT)
Dept: CARDIOLOGY | Facility: OTHER | Age: 40
End: 2020-01-17
Attending: INTERNAL MEDICINE
Payer: COMMERCIAL

## 2020-01-17 VITALS
HEIGHT: 66 IN | HEART RATE: 70 BPM | OXYGEN SATURATION: 98 % | WEIGHT: 189 LBS | SYSTOLIC BLOOD PRESSURE: 123 MMHG | DIASTOLIC BLOOD PRESSURE: 82 MMHG | BODY MASS INDEX: 30.37 KG/M2 | TEMPERATURE: 98.3 F | RESPIRATION RATE: 14 BRPM

## 2020-01-17 DIAGNOSIS — I47.10 PAROXYSMAL SUPRAVENTRICULAR TACHYCARDIA (H): Primary | ICD-10-CM

## 2020-01-17 DIAGNOSIS — I49.1 PAC (PREMATURE ATRIAL CONTRACTION): ICD-10-CM

## 2020-01-17 DIAGNOSIS — I49.9 IRREGULAR HEART RATE: ICD-10-CM

## 2020-01-17 DIAGNOSIS — F41.9 ANXIETY: Primary | ICD-10-CM

## 2020-01-17 DIAGNOSIS — R00.2 PALPITATIONS: ICD-10-CM

## 2020-01-17 DIAGNOSIS — Z86.59 HISTORY OF PANIC ATTACKS: ICD-10-CM

## 2020-01-17 DIAGNOSIS — I47.10 SVT (SUPRAVENTRICULAR TACHYCARDIA) (H): ICD-10-CM

## 2020-01-17 DIAGNOSIS — I49.3 PVC'S (PREMATURE VENTRICULAR CONTRACTIONS): ICD-10-CM

## 2020-01-17 DIAGNOSIS — I47.10 PAROXYSMAL SUPRAVENTRICULAR TACHYCARDIA (H): ICD-10-CM

## 2020-01-17 PROCEDURE — 93000 ELECTROCARDIOGRAM COMPLETE: CPT | Performed by: INTERNAL MEDICINE

## 2020-01-17 PROCEDURE — 99204 OFFICE O/P NEW MOD 45 MIN: CPT | Performed by: INTERNAL MEDICINE

## 2020-01-17 RX ORDER — METOPROLOL SUCCINATE 25 MG/1
25 TABLET, EXTENDED RELEASE ORAL DAILY
Qty: 90 TABLET | Refills: 3 | Status: SHIPPED | OUTPATIENT
Start: 2020-01-17 | End: 2020-12-28

## 2020-01-17 ASSESSMENT — MIFFLIN-ST. JEOR: SCORE: 1559.05

## 2020-01-17 ASSESSMENT — PAIN SCALES - GENERAL: PAINLEVEL: NO PAIN (0)

## 2020-01-17 NOTE — NURSING NOTE
"Chief Complaint   Patient presents with     New Patient       Initial /82   Pulse 70   Temp 98.3  F (36.8  C)   Ht 1.676 m (5' 6\")   Wt 85.7 kg (189 lb)   SpO2 98%   BMI 30.51 kg/m   Estimated body mass index is 30.51 kg/m  as calculated from the following:    Height as of this encounter: 1.676 m (5' 6\").    Weight as of this encounter: 85.7 kg (189 lb).  Medication Reconciliation: complete  Jessa Behrman, LPN  "

## 2020-01-17 NOTE — PATIENT INSTRUCTIONS
You were seen by Dr. Whipple, 01/17/20.     1.  Please consider seeing Dr. Gonzalez the electrophysiologist via telemedicine. If you decide to do this please let us know and we can help facilitate this for you.      2. Please continue all other medications as they have been prescribed.     3. If you develop new or worsening symptoms, please call the cardiology office as you may need to be evaluated.     You will follow up with Dr. Whipple as needed.       Please call the cardiology office with problems, questions, or concerns at [phone].    If you experience chest pain, chest pressure, chest tightness, shortness of breath, fainting, lightheadedness, nausea, vomiting, or other concerning symptoms, please report to the Emergency Department or call 911. These symptoms may be emergent, and best treated in the Emergency Department.       Barbara MTZ RN  Cardiology   [facility]  [phone]

## 2020-03-02 ENCOUNTER — HEALTH MAINTENANCE LETTER (OUTPATIENT)
Age: 40
End: 2020-03-02

## 2020-04-19 DIAGNOSIS — F41.9 ANXIETY: ICD-10-CM

## 2020-08-18 DIAGNOSIS — F41.9 ANXIETY: ICD-10-CM

## 2020-08-18 NOTE — TELEPHONE ENCOUNTER
Sertraline 50 MG      Last Written Prescription Date:  4-  Last Fill Quantity: 30,   # refills: 3  Last Office Visit: 12-11-19  Future Office visit:

## 2020-12-22 NOTE — PROGRESS NOTES
Aura Carmona is a 40 year old female who presents to clinic today for the following health issues:          Anxiety Follow-Up    How are you doing with your anxiety since your last visit? Improved     Are you having other symptoms that might be associated with anxiety? No    Have you had a significant life event? No     Are you feeling depressed? No    Do you have any concerns with your use of alcohol or other drugs? No       Social History     Tobacco Use     Smoking status: Never Smoker     Smokeless tobacco: Never Used   Substance Use Topics     Alcohol use: Yes     Comment: occasional     Drug use: No       LUZ-7 SCORE 2019   Total Score 0 1 0       PHQ 2019   PHQ-9 Total Score 1 1 1   Q9: Thoughts of better off dead/self-harm past 2 weeks Not at all Not at all Not at all           How many servings of fruits and vegetables do you eat daily?  2-3    On average, how many sweetened beverages do you drink each day (Examples: soda, juice, sweet tea, etc.  Do NOT count diet or artificially sweetened beverages)?   1    How many days per week do you exercise enough to make your heart beat faster? 3 or less    How many minutes a day do you exercise enough to make your heart beat faster? 9 or less    How many days per week do you miss taking your medication? 0      History of SVT, palpitations - is on Toprol, doing well. No concerns.  No chest pain ,SOB, no edema      Patient Active Problem List   Diagnosis     Family history of melanoma     Fibrocystic breast changes     SVT (supraventricular tachycardia) (H)     Palpitations     PAC (premature atrial contraction)     PVC's (premature ventricular contractions)     Paroxysmal supraventricular tachycardia (H)     Anxiety     History of panic attacks     Past Surgical History:   Procedure Laterality Date     APPENDECTOMY      [place]     C/SECTION, CLASSICAL      x's 3      SECTION  11-24-15     "laparotomy     CHOLECYSTECTOMY       GALLBLADDER SURGERY  2005    [facility]     wisdom teeth         Social History     Tobacco Use     Smoking status: Never Smoker     Smokeless tobacco: Never Used   Substance Use Topics     Alcohol use: Yes     Comment: occasional     Family History   Problem Relation Age of Onset     Hypertension Father      Diabetes Maternal Grandmother      Alzheimer Disease Maternal Grandfather      Heart Disease Paternal Grandmother      Heart Disease Paternal Grandfather            Current Outpatient Medications   Medication Sig Dispense Refill     CHOLECALCIFEROL PO Take 2,000 Units by mouth       metoprolol succinate ER (TOPROL-XL) 25 MG 24 hr tablet Take 1 tablet (25 mg) by mouth daily 90 tablet 3     multivitamin w/minerals (MULTI-VITAMIN) tablet Take 1 tablet by mouth daily       sertraline (ZOLOFT) 50 MG tablet TAKE ONE-HALF TO 1 TABLET BY MOUTH EVERY DAY. CONTINUE AT 25MG. MAY INCREASE AFTER 2 TO 4 WEEKS AS TOLERATED 30 tablet 0     magnesium oxide 400 MG CAPS        No Known Allergies     Recent Labs   Lab Test 10/18/19  0844 09/30/19  1558   A1C  --  5.1   ALT 23 15   CR 0.70 0.72   GFRESTIMATED >90 >90   GFRESTBLACK >90 >90   POTASSIUM 3.5 4.0   TSH 2.28 1.68        BP Readings from Last 3 Encounters:   12/28/20 118/70   01/17/20 123/82   12/11/19 114/74    Wt Readings from Last 3 Encounters:   12/28/20 95.3 kg (210 lb)   01/17/20 85.7 kg (189 lb)   12/11/19 84.8 kg (187 lb)               Review of Systems   Constitutional, HEENT, cardiovascular, pulmonary, gi and gu systems are negative, except as otherwise noted.        Objective    /70   Pulse 71   Temp 99.1  F (37.3  C) (Tympanic)   Ht 1.651 m (5' 5\")   Wt 95.3 kg (210 lb)   SpO2 99%   BMI 34.95 kg/m    Body mass index is 34.95 kg/m .       Physical Exam   GENERAL: healthy, alert and no distress  EYES: Eyes grossly normal to inspection, PERRL and conjunctivae and sclerae normal  NECK: no adenopathy, no " asymmetry, masses, or scars and thyroid normal to palpation  RESP: lungs clear to auscultation - no rales, rhonchi or wheezes  CV: regular rate and rhythm, normal S1 S2, no S3 or S4, no murmur, click or rub, no peripheral edema and peripheral pulses strong  SKIN: no suspicious lesions or rashes  PSYCH: mentation appears normal, affect normal/bright            Assessment & Plan     Anxiety  - TSH with free T4 reflex    History of panic attacks  - Stable    PVC's (premature ventricular contractions)  - EKG 12-lead complete w/read - (Clinic Performed)  - TSH with free T4 reflex  - CBC with platelets differential  - Comprehensive metabolic panel    SVT (supraventricular tachycardia) (H)  - EKG 12-lead complete w/read - (Clinic Performed)  - TSH with free T4 reflex  - CBC with platelets differential  - Comprehensive metabolic panel    Encounter for screening mammogram for malignant neoplasm of breast  - MA SCREENING DIGITAL BILATERAL ([place]); Future          Return in about 6 months (around 6/28/2021).    Zunilda Matute CNP  [facility]

## 2020-12-28 ENCOUNTER — OFFICE VISIT (OUTPATIENT)
Dept: FAMILY MEDICINE | Facility: OTHER | Age: 40
End: 2020-12-28
Attending: NURSE PRACTITIONER
Payer: COMMERCIAL

## 2020-12-28 VITALS
HEART RATE: 71 BPM | HEIGHT: 65 IN | BODY MASS INDEX: 34.99 KG/M2 | TEMPERATURE: 99.1 F | SYSTOLIC BLOOD PRESSURE: 118 MMHG | DIASTOLIC BLOOD PRESSURE: 70 MMHG | WEIGHT: 210 LBS | OXYGEN SATURATION: 99 %

## 2020-12-28 DIAGNOSIS — F41.9 ANXIETY: Primary | ICD-10-CM

## 2020-12-28 DIAGNOSIS — I47.10 SVT (SUPRAVENTRICULAR TACHYCARDIA) (H): ICD-10-CM

## 2020-12-28 DIAGNOSIS — Z12.31 ENCOUNTER FOR SCREENING MAMMOGRAM FOR MALIGNANT NEOPLASM OF BREAST: ICD-10-CM

## 2020-12-28 DIAGNOSIS — I47.10 PAROXYSMAL SUPRAVENTRICULAR TACHYCARDIA (H): ICD-10-CM

## 2020-12-28 DIAGNOSIS — I49.3 PVC'S (PREMATURE VENTRICULAR CONTRACTIONS): ICD-10-CM

## 2020-12-28 DIAGNOSIS — Z86.59 HISTORY OF PANIC ATTACKS: ICD-10-CM

## 2020-12-28 LAB
ALBUMIN SERPL-MCNC: 3.7 G/DL (ref 3.4–5)
ALP SERPL-CCNC: 75 U/L (ref 40–150)
ALT SERPL W P-5'-P-CCNC: 44 U/L (ref 0–50)
ANION GAP SERPL CALCULATED.3IONS-SCNC: 5 MMOL/L (ref 3–14)
AST SERPL W P-5'-P-CCNC: 33 U/L (ref 0–45)
BASOPHILS # BLD AUTO: 0 10E9/L (ref 0–0.2)
BASOPHILS NFR BLD AUTO: 0.6 %
BILIRUB SERPL-MCNC: 0.3 MG/DL (ref 0.2–1.3)
BUN SERPL-MCNC: 11 MG/DL (ref 7–30)
CALCIUM SERPL-MCNC: 8.8 MG/DL (ref 8.5–10.1)
CHLORIDE SERPL-SCNC: 107 MMOL/L (ref 94–109)
CO2 SERPL-SCNC: 27 MMOL/L (ref 20–32)
CREAT SERPL-MCNC: 0.67 MG/DL (ref 0.52–1.04)
DIFFERENTIAL METHOD BLD: ABNORMAL
EOSINOPHIL # BLD AUTO: 0.2 10E9/L (ref 0–0.7)
EOSINOPHIL NFR BLD AUTO: 4.3 %
ERYTHROCYTE [DISTWIDTH] IN BLOOD BY AUTOMATED COUNT: 13.1 % (ref 10–15)
GFR SERPL CREATININE-BSD FRML MDRD: >90 ML/MIN/{1.73_M2}
GLUCOSE SERPL-MCNC: 72 MG/DL (ref 70–99)
HCT VFR BLD AUTO: 38.1 % (ref 35–47)
HGB BLD-MCNC: 12.2 G/DL (ref 11.7–15.7)
LYMPHOCYTES # BLD AUTO: 1.8 10E9/L (ref 0.8–5.3)
LYMPHOCYTES NFR BLD AUTO: 34 %
MCH RBC QN AUTO: 25.8 PG (ref 26.5–33)
MCHC RBC AUTO-ENTMCNC: 32 G/DL (ref 31.5–36.5)
MCV RBC AUTO: 81 FL (ref 78–100)
MONOCYTES # BLD AUTO: 0.4 10E9/L (ref 0–1.3)
MONOCYTES NFR BLD AUTO: 7.1 %
NEUTROPHILS # BLD AUTO: 2.9 10E9/L (ref 1.6–8.3)
NEUTROPHILS NFR BLD AUTO: 54 %
PLATELET # BLD AUTO: 259 10E9/L (ref 150–450)
POTASSIUM SERPL-SCNC: 3.8 MMOL/L (ref 3.4–5.3)
PROT SERPL-MCNC: 7.6 G/DL (ref 6.8–8.8)
RBC # BLD AUTO: 4.72 10E12/L (ref 3.8–5.2)
SODIUM SERPL-SCNC: 139 MMOL/L (ref 133–144)
TSH SERPL DL<=0.005 MIU/L-ACNC: 1.8 MU/L (ref 0.4–4)
WBC # BLD AUTO: 5.4 10E9/L (ref 4–11)

## 2020-12-28 PROCEDURE — 36415 COLL VENOUS BLD VENIPUNCTURE: CPT | Performed by: NURSE PRACTITIONER

## 2020-12-28 PROCEDURE — 99214 OFFICE O/P EST MOD 30 MIN: CPT | Mod: 25 | Performed by: NURSE PRACTITIONER

## 2020-12-28 PROCEDURE — 93000 ELECTROCARDIOGRAM COMPLETE: CPT | Performed by: INTERNAL MEDICINE

## 2020-12-28 PROCEDURE — 80050 GENERAL HEALTH PANEL: CPT | Performed by: NURSE PRACTITIONER

## 2020-12-28 RX ORDER — METOPROLOL SUCCINATE 25 MG/1
25 TABLET, EXTENDED RELEASE ORAL DAILY
Qty: 90 TABLET | Refills: 3 | Status: SHIPPED | OUTPATIENT
Start: 2020-12-28 | End: 2021-01-11

## 2020-12-28 ASSESSMENT — ANXIETY QUESTIONNAIRES
6. BECOMING EASILY ANNOYED OR IRRITABLE: NOT AT ALL
2. NOT BEING ABLE TO STOP OR CONTROL WORRYING: NOT AT ALL
5. BEING SO RESTLESS THAT IT IS HARD TO SIT STILL: NOT AT ALL
3. WORRYING TOO MUCH ABOUT DIFFERENT THINGS: NOT AT ALL
1. FEELING NERVOUS, ANXIOUS, OR ON EDGE: NOT AT ALL
IF YOU CHECKED OFF ANY PROBLEMS ON THIS QUESTIONNAIRE, HOW DIFFICULT HAVE THESE PROBLEMS MADE IT FOR YOU TO DO YOUR WORK, TAKE CARE OF THINGS AT HOME, OR GET ALONG WITH OTHER PEOPLE: NOT DIFFICULT AT ALL
7. FEELING AFRAID AS IF SOMETHING AWFUL MIGHT HAPPEN: NOT AT ALL
GAD7 TOTAL SCORE: 0
4. TROUBLE RELAXING: NOT AT ALL

## 2020-12-28 ASSESSMENT — PATIENT HEALTH QUESTIONNAIRE - PHQ9: SUM OF ALL RESPONSES TO PHQ QUESTIONS 1-9: 1

## 2020-12-28 ASSESSMENT — MIFFLIN-ST. JEOR: SCORE: 1633.43

## 2020-12-28 ASSESSMENT — PAIN SCALES - GENERAL: PAINLEVEL: NO PAIN (0)

## 2020-12-28 NOTE — NURSING NOTE
"Chief Complaint   Patient presents with     Anxiety       Initial /70   Pulse 71   Temp 99.1  F (37.3  C) (Tympanic)   Ht 1.651 m (5' 5\")   Wt 95.3 kg (210 lb)   SpO2 99%   BMI 34.95 kg/m   Estimated body mass index is 34.95 kg/m  as calculated from the following:    Height as of this encounter: 1.651 m (5' 5\").    Weight as of this encounter: 95.3 kg (210 lb).  Medication Reconciliation: complete  Vale Snow LPN  "

## 2020-12-28 NOTE — PATIENT INSTRUCTIONS
Assessment & Plan     Anxiety  - TSH with free T4 reflex    History of panic attacks  - Stable    PVC's (premature ventricular contractions)  - EKG 12-lead complete w/read - (Clinic Performed)  - TSH with free T4 reflex  - CBC with platelets differential  - Comprehensive metabolic panel    SVT (supraventricular tachycardia) (H)  - EKG 12-lead complete w/read - (Clinic Performed)  - TSH with free T4 reflex  - CBC with platelets differential  - Comprehensive metabolic panel    Encounter for screening mammogram for malignant neoplasm of breast  - MA SCREENING DIGITAL BILATERAL ([place]); Future          Return in about 6 months (around 6/28/2021).    Zunilda Matute, CNP  [facility] [place] [facility]

## 2020-12-29 ASSESSMENT — ANXIETY QUESTIONNAIRES: GAD7 TOTAL SCORE: 0

## 2021-01-10 DIAGNOSIS — I47.10 PAROXYSMAL SUPRAVENTRICULAR TACHYCARDIA (H): ICD-10-CM

## 2021-01-11 RX ORDER — METOPROLOL SUCCINATE 25 MG/1
TABLET, EXTENDED RELEASE ORAL
Qty: 90 TABLET | Refills: 3 | Status: SHIPPED | OUTPATIENT
Start: 2021-01-11 | End: 2022-01-14

## 2021-01-11 NOTE — TELEPHONE ENCOUNTER
Metoprolol       Last Written Prescription Date:  12/28/2020  Last Fill Quantity: 90,   # refills: 3  Last Office Visit: 12/28/2020  Future Office visit:       Routing refill request to provider for review/approval

## 2021-01-15 ENCOUNTER — HEALTH MAINTENANCE LETTER (OUTPATIENT)
Age: 41
End: 2021-01-15

## 2021-02-22 ENCOUNTER — ANCILLARY PROCEDURE (OUTPATIENT)
Dept: MAMMOGRAPHY | Facility: OTHER | Age: 41
End: 2021-02-22
Attending: NURSE PRACTITIONER
Payer: COMMERCIAL

## 2021-02-22 DIAGNOSIS — Z12.31 ENCOUNTER FOR SCREENING MAMMOGRAM FOR MALIGNANT NEOPLASM OF BREAST: ICD-10-CM

## 2021-02-22 PROCEDURE — 77063 BREAST TOMOSYNTHESIS BI: CPT | Mod: TC | Performed by: RADIOLOGY

## 2021-02-22 PROCEDURE — 77067 SCR MAMMO BI INCL CAD: CPT | Mod: TC | Performed by: RADIOLOGY

## 2021-04-18 ENCOUNTER — HEALTH MAINTENANCE LETTER (OUTPATIENT)
Age: 41
End: 2021-04-18

## 2021-04-21 ENCOUNTER — TELEPHONE (OUTPATIENT)
Dept: FAMILY MEDICINE | Facility: OTHER | Age: 41
End: 2021-04-21

## 2021-05-05 ENCOUNTER — TRANSFERRED RECORDS (OUTPATIENT)
Dept: HEALTH INFORMATION MANAGEMENT | Facility: CLINIC | Age: 41
End: 2021-05-05

## 2021-07-20 DIAGNOSIS — F41.9 ANXIETY: ICD-10-CM

## 2021-10-03 ENCOUNTER — HEALTH MAINTENANCE LETTER (OUTPATIENT)
Age: 41
End: 2021-10-03

## 2022-01-12 DIAGNOSIS — R00.2 PALPITATIONS: ICD-10-CM

## 2022-01-12 DIAGNOSIS — I47.10 SVT (SUPRAVENTRICULAR TACHYCARDIA) (H): ICD-10-CM

## 2022-01-12 DIAGNOSIS — I49.1 PAC (PREMATURE ATRIAL CONTRACTION): Primary | ICD-10-CM

## 2022-01-12 DIAGNOSIS — I49.3 PVC'S (PREMATURE VENTRICULAR CONTRACTIONS): ICD-10-CM

## 2022-01-12 DIAGNOSIS — I47.10 PAROXYSMAL SUPRAVENTRICULAR TACHYCARDIA (H): ICD-10-CM

## 2022-01-14 RX ORDER — METOPROLOL SUCCINATE 25 MG/1
TABLET, EXTENDED RELEASE ORAL
Qty: 90 TABLET | Refills: 3 | Status: SHIPPED | OUTPATIENT
Start: 2022-01-14 | End: 2022-02-25

## 2022-01-14 NOTE — TELEPHONE ENCOUNTER
TOPROL XL      Last Written Prescription Date:  1-  Last Fill Quantity: 90,   # refills: 3  Last Office Visit: 1-  Future Office visit:       Routing refill request to provider for review/approval because:  Drug not on the [facility], [facility] or [facility] refill protocol or controlled substance

## 2022-02-17 DIAGNOSIS — F41.9 ANXIETY: ICD-10-CM

## 2022-02-17 NOTE — TELEPHONE ENCOUNTER
SERTRALINE 50MG TABLETS  Last Written Prescription Date:  7/20/2021  Last Fill Quantity: 90,   # refills: 1  Last Office Visit: 12/28/2020  Future Office visit:       Routing refill request to provider for review/approval because:

## 2022-02-18 NOTE — TELEPHONE ENCOUNTER
Please schedule patient for office visit with Zunilda, then please route task back to her to approve refill once appointment scheduled.

## 2022-02-25 ENCOUNTER — OFFICE VISIT (OUTPATIENT)
Dept: FAMILY MEDICINE | Facility: OTHER | Age: 42
End: 2022-02-25
Attending: NURSE PRACTITIONER
Payer: COMMERCIAL

## 2022-02-25 VITALS
BODY MASS INDEX: 36.28 KG/M2 | DIASTOLIC BLOOD PRESSURE: 77 MMHG | TEMPERATURE: 97.5 F | RESPIRATION RATE: 18 BRPM | WEIGHT: 218 LBS | OXYGEN SATURATION: 100 % | HEART RATE: 69 BPM | SYSTOLIC BLOOD PRESSURE: 112 MMHG

## 2022-02-25 DIAGNOSIS — R00.2 PALPITATIONS: ICD-10-CM

## 2022-02-25 DIAGNOSIS — I49.1 PAC (PREMATURE ATRIAL CONTRACTION): ICD-10-CM

## 2022-02-25 DIAGNOSIS — I49.3 PVC'S (PREMATURE VENTRICULAR CONTRACTIONS): ICD-10-CM

## 2022-02-25 DIAGNOSIS — F41.9 ANXIETY: ICD-10-CM

## 2022-02-25 DIAGNOSIS — I47.10 SVT (SUPRAVENTRICULAR TACHYCARDIA) (H): ICD-10-CM

## 2022-02-25 DIAGNOSIS — Z12.31 ENCOUNTER FOR SCREENING MAMMOGRAM FOR MALIGNANT NEOPLASM OF BREAST: Primary | ICD-10-CM

## 2022-02-25 DIAGNOSIS — I47.10 PAROXYSMAL SUPRAVENTRICULAR TACHYCARDIA (H): ICD-10-CM

## 2022-02-25 LAB
ALBUMIN SERPL-MCNC: 3.8 G/DL (ref 3.4–5)
ALP SERPL-CCNC: 64 U/L (ref 40–150)
ALT SERPL W P-5'-P-CCNC: 17 U/L (ref 0–50)
ANION GAP SERPL CALCULATED.3IONS-SCNC: 5 MMOL/L (ref 3–14)
AST SERPL W P-5'-P-CCNC: 13 U/L (ref 0–45)
BILIRUB SERPL-MCNC: 0.3 MG/DL (ref 0.2–1.3)
BUN SERPL-MCNC: 11 MG/DL (ref 7–30)
CALCIUM SERPL-MCNC: 9.2 MG/DL (ref 8.5–10.1)
CHLORIDE BLD-SCNC: 105 MMOL/L (ref 94–109)
CO2 SERPL-SCNC: 27 MMOL/L (ref 20–32)
CREAT SERPL-MCNC: 0.64 MG/DL (ref 0.52–1.04)
GFR SERPL CREATININE-BSD FRML MDRD: >90 ML/MIN/1.73M2
GLUCOSE BLD-MCNC: 94 MG/DL (ref 70–99)
HOLD SPECIMEN: NORMAL
POTASSIUM BLD-SCNC: 3.8 MMOL/L (ref 3.4–5.3)
PROT SERPL-MCNC: 7.9 G/DL (ref 6.8–8.8)
SODIUM SERPL-SCNC: 137 MMOL/L (ref 133–144)
TSH SERPL DL<=0.005 MIU/L-ACNC: 1.63 MU/L (ref 0.4–4)

## 2022-02-25 PROCEDURE — 84443 ASSAY THYROID STIM HORMONE: CPT | Performed by: NURSE PRACTITIONER

## 2022-02-25 PROCEDURE — 36415 COLL VENOUS BLD VENIPUNCTURE: CPT | Performed by: NURSE PRACTITIONER

## 2022-02-25 PROCEDURE — 80053 COMPREHEN METABOLIC PANEL: CPT | Performed by: NURSE PRACTITIONER

## 2022-02-25 PROCEDURE — 99214 OFFICE O/P EST MOD 30 MIN: CPT | Performed by: NURSE PRACTITIONER

## 2022-02-25 RX ORDER — METOPROLOL SUCCINATE 25 MG/1
25 TABLET, EXTENDED RELEASE ORAL DAILY
Qty: 90 TABLET | Refills: 3 | Status: SHIPPED | OUTPATIENT
Start: 2022-02-25 | End: 2023-01-20

## 2022-02-25 ASSESSMENT — ANXIETY QUESTIONNAIRES
6. BECOMING EASILY ANNOYED OR IRRITABLE: NOT AT ALL
IF YOU CHECKED OFF ANY PROBLEMS ON THIS QUESTIONNAIRE, HOW DIFFICULT HAVE THESE PROBLEMS MADE IT FOR YOU TO DO YOUR WORK, TAKE CARE OF THINGS AT HOME, OR GET ALONG WITH OTHER PEOPLE: NOT DIFFICULT AT ALL
7. FEELING AFRAID AS IF SOMETHING AWFUL MIGHT HAPPEN: NOT AT ALL
2. NOT BEING ABLE TO STOP OR CONTROL WORRYING: NOT AT ALL
GAD7 TOTAL SCORE: 0
3. WORRYING TOO MUCH ABOUT DIFFERENT THINGS: NOT AT ALL
1. FEELING NERVOUS, ANXIOUS, OR ON EDGE: NOT AT ALL
5. BEING SO RESTLESS THAT IT IS HARD TO SIT STILL: NOT AT ALL

## 2022-02-25 ASSESSMENT — PAIN SCALES - GENERAL: PAINLEVEL: NO PAIN (0)

## 2022-02-25 ASSESSMENT — PATIENT HEALTH QUESTIONNAIRE - PHQ9
5. POOR APPETITE OR OVEREATING: NOT AT ALL
SUM OF ALL RESPONSES TO PHQ QUESTIONS 1-9: 1

## 2022-02-25 NOTE — NURSING NOTE
"Chief Complaint   Patient presents with     Anxiety       Initial /77 (BP Location: Left arm, Patient Position: Sitting, Cuff Size: Adult Large)   Pulse 69   Temp 97.5  F (36.4  C) (Tympanic)   Resp 18   Wt 98.9 kg (218 lb)   SpO2 100%   BMI 36.28 kg/m   Estimated body mass index is 36.28 kg/m  as calculated from the following:    Height as of 12/28/20: 1.651 m (5' 5\").    Weight as of this encounter: 98.9 kg (218 lb).  Medication Reconciliation: complete  Fatou Lugo LPN  "

## 2022-02-25 NOTE — PROGRESS NOTES
Assessment & Plan          Anxiety  - sertraline (ZOLOFT) 50 MG tablet; TAKE 1/2 TO 1 TABLET BY MOUTH EVERY DAY.  - TSH with free T4 reflex; Future      Paroxysmal supraventricular tachycardia (H)  - metoprolol succinate ER (TOPROL-XL) 25 MG 24 hr tablet; Take 1 tablet (25 mg) by mouth daily  - TSH with free T4 reflex; Future  - Comprehensive metabolic panel; Future      PAC (premature atrial contraction)  - metoprolol succinate ER (TOPROL-XL) 25 MG 24 hr tablet; Take 1 tablet (25 mg) by mouth daily  - Comprehensive metabolic panel; Future      Palpitations  - metoprolol succinate ER (TOPROL-XL) 25 MG 24 hr tablet; Take 1 tablet (25 mg) by mouth daily  - Comprehensive metabolic panel; Future      PVC's (premature ventricular contractions)  - metoprolol succinate ER (TOPROL-XL) 25 MG 24 hr tablet; Take 1 tablet (25 mg) by mouth daily  - Comprehensive metabolic panel; Future      SVT (supraventricular tachycardia) (H)  - metoprolol succinate ER (TOPROL-XL) 25 MG 24 hr tablet; Take 1 tablet (25 mg) by mouth daily  - Comprehensive metabolic panel; Future      Encounter for screening mammogram for malignant neoplasm of breast  - MA SCREENING DIGITAL BILATERAL ([place]); Future        Return in about 6 months (around 8/25/2022).        Zunilda Matute, CNP  [facility] - [place] Chavarria is a 41 year old who presents for the following health issues         Anxiety Follow-Up    How are you doing with your anxiety since your last visit? Improved     Are you having other symptoms that might be associated with anxiety? No    Have you had a significant life event? No     Are you feeling depressed? No    Do you have any concerns with your use of alcohol or other drugs? No         History of PVC's, SVT - on Toprol XL  Stable  Occasionally notes a PVC  No lightheadedness  No dizziness  No chest pain or SOB        Social History     Tobacco Use     Smoking status: Never Smoker     Smokeless tobacco: Never Used    Substance Use Topics     Alcohol use: Yes     Comment: occasional     Drug use: No         LUZ-7 SCORE 2019   Total Score 1 0 0         PHQ 2019   PHQ-9 Total Score 1 1 1   Q9: Thoughts of better off dead/self-harm past 2 weeks Not at all Not at all Not at all       Patient Active Problem List   Diagnosis     Family history of melanoma     Fibrocystic breast changes     SVT (supraventricular tachycardia) (H)     Palpitations     PAC (premature atrial contraction)     PVC's (premature ventricular contractions)     Paroxysmal supraventricular tachycardia (H)     Anxiety     History of panic attacks     Past Surgical History:   Procedure Laterality Date     APPENDECTOMY  2009    [place]     C/SECTION, CLASSICAL      x's 3      SECTION  11-24-15    laparotomy     CHOLECYSTECTOMY       GALLBLADDER SURGERY      [facility] teeth         Social History     Tobacco Use     Smoking status: Never Smoker     Smokeless tobacco: Never Used   Substance Use Topics     Alcohol use: Yes     Comment: occasional     Family History   Problem Relation Age of Onset     Hypertension Father      Diabetes Maternal Grandmother      Alzheimer Disease Maternal Grandfather      Heart Disease Paternal Grandmother      Heart Disease Paternal Grandfather            Current Outpatient Medications   Medication Sig Dispense Refill     CHOLECALCIFEROL PO Take 2,000 Units by mouth       metoprolol succinate ER (TOPROL-XL) 25 MG 24 hr tablet Take 1 tablet (25 mg) by mouth daily 90 tablet 3     multivitamin w/minerals (MULTI-VITAMIN) tablet Take 1 tablet by mouth daily       sertraline (ZOLOFT) 50 MG tablet TAKE 1/2 TO 1 TABLET BY MOUTH EVERY DAY. 90 tablet 1       No Known Allergies       Recent Labs   Lab Test 20  1031 10/18/19  0844 19  1558   A1C  --   --  5.1   ALT 44 23 15   CR 0.67 0.70 0.72   GFRESTIMATED >90 >90 >90   GFRESTBLACK >90 >90 >90    POTASSIUM 3.8 3.5 4.0   TSH 1.80 2.28 1.68        BP Readings from Last 3 Encounters:   02/25/22 112/77   12/28/20 118/70   01/17/20 123/82    Wt Readings from Last 3 Encounters:   02/25/22 98.9 kg (218 lb)   12/28/20 95.3 kg (210 lb)   01/17/20 85.7 kg (189 lb)              Review of Systems   Constitutional, HEENT, cardiovascular, pulmonary, GI, , musculoskeletal, neuro, skin, endocrine and psych systems are negative, except as otherwise noted.          Objective    /77 (BP Location: Left arm, Patient Position: Sitting, Cuff Size: Adult Large)   Pulse 69   Temp 97.5  F (36.4  C) (Tympanic)   Resp 18   Wt 98.9 kg (218 lb)   SpO2 100%   BMI 36.28 kg/m    Body mass index is 36.28 kg/m .         Physical Exam   GENERAL: healthy, alert and no distress  EYES: Eyes grossly normal to inspection, PERRL and conjunctivae and sclerae normal  NECK: no adenopathy, no asymmetry, masses, or scars and thyroid normal to palpation  RESP: lungs clear to auscultation - no rales, rhonchi or wheezes  CV: regular rate and rhythm, normal S1 S2, no S3 or S4, no murmur, click or rub, no peripheral edema and peripheral pulses strong  SKIN: no suspicious lesions or rashes  PSYCH: mentation appears normal, affect normal/bright

## 2022-02-25 NOTE — PATIENT INSTRUCTIONS
Assessment & Plan        Anxiety  - sertraline (ZOLOFT) 50 MG tablet; TAKE 1/2 TO 1 TABLET BY MOUTH EVERY DAY.  - TSH with free T4 reflex; Future    Paroxysmal supraventricular tachycardia (H)  - metoprolol succinate ER (TOPROL-XL) 25 MG 24 hr tablet; Take 1 tablet (25 mg) by mouth daily  - TSH with free T4 reflex; Future  - Comprehensive metabolic panel; Future    PAC (premature atrial contraction)  - metoprolol succinate ER (TOPROL-XL) 25 MG 24 hr tablet; Take 1 tablet (25 mg) by mouth daily  - Comprehensive metabolic panel; Future    Palpitations  - metoprolol succinate ER (TOPROL-XL) 25 MG 24 hr tablet; Take 1 tablet (25 mg) by mouth daily  - Comprehensive metabolic panel; Future    PVC's (premature ventricular contractions)  - metoprolol succinate ER (TOPROL-XL) 25 MG 24 hr tablet; Take 1 tablet (25 mg) by mouth daily  - Comprehensive metabolic panel; Future    SVT (supraventricular tachycardia) (H)  - metoprolol succinate ER (TOPROL-XL) 25 MG 24 hr tablet; Take 1 tablet (25 mg) by mouth daily  - Comprehensive metabolic panel; Future    Encounter for screening mammogram for malignant neoplasm of breast  - MA SCREENING DIGITAL BILATERAL ([place]); Future        Return in about 6 months (around 8/25/2022).        Zunilda Matute CNP  [facility]

## 2022-02-26 ASSESSMENT — ANXIETY QUESTIONNAIRES: GAD7 TOTAL SCORE: 0

## 2022-04-11 ENCOUNTER — ANCILLARY PROCEDURE (OUTPATIENT)
Dept: MAMMOGRAPHY | Facility: OTHER | Age: 42
End: 2022-04-11
Attending: NURSE PRACTITIONER
Payer: COMMERCIAL

## 2022-04-11 DIAGNOSIS — Z12.31 ENCOUNTER FOR SCREENING MAMMOGRAM FOR MALIGNANT NEOPLASM OF BREAST: ICD-10-CM

## 2022-04-11 PROCEDURE — 77063 BREAST TOMOSYNTHESIS BI: CPT | Mod: TC | Performed by: RADIOLOGY

## 2022-04-11 PROCEDURE — 77067 SCR MAMMO BI INCL CAD: CPT | Mod: TC | Performed by: RADIOLOGY

## 2022-04-12 ENCOUNTER — TELEPHONE (OUTPATIENT)
Dept: MAMMOGRAPHY | Facility: OTHER | Age: 42
End: 2022-04-12
Payer: COMMERCIAL

## 2022-05-14 ENCOUNTER — HEALTH MAINTENANCE LETTER (OUTPATIENT)
Age: 42
End: 2022-05-14

## 2022-08-24 ENCOUNTER — OFFICE VISIT (OUTPATIENT)
Dept: FAMILY MEDICINE | Facility: OTHER | Age: 42
End: 2022-08-24
Attending: NURSE PRACTITIONER
Payer: COMMERCIAL

## 2022-08-24 VITALS
SYSTOLIC BLOOD PRESSURE: 110 MMHG | RESPIRATION RATE: 18 BRPM | WEIGHT: 222 LBS | BODY MASS INDEX: 36.94 KG/M2 | DIASTOLIC BLOOD PRESSURE: 76 MMHG | TEMPERATURE: 97.7 F | OXYGEN SATURATION: 99 % | HEART RATE: 71 BPM

## 2022-08-24 DIAGNOSIS — F41.9 ANXIETY: ICD-10-CM

## 2022-08-24 DIAGNOSIS — K64.4 EXTERNAL HEMORRHOIDS: ICD-10-CM

## 2022-08-24 DIAGNOSIS — I47.10 SVT (SUPRAVENTRICULAR TACHYCARDIA) (H): Primary | ICD-10-CM

## 2022-08-24 PROCEDURE — 99214 OFFICE O/P EST MOD 30 MIN: CPT | Performed by: NURSE PRACTITIONER

## 2022-08-24 ASSESSMENT — ANXIETY QUESTIONNAIRES
2. NOT BEING ABLE TO STOP OR CONTROL WORRYING: NOT AT ALL
6. BECOMING EASILY ANNOYED OR IRRITABLE: NOT AT ALL
IF YOU CHECKED OFF ANY PROBLEMS ON THIS QUESTIONNAIRE, HOW DIFFICULT HAVE THESE PROBLEMS MADE IT FOR YOU TO DO YOUR WORK, TAKE CARE OF THINGS AT HOME, OR GET ALONG WITH OTHER PEOPLE: NOT DIFFICULT AT ALL
1. FEELING NERVOUS, ANXIOUS, OR ON EDGE: SEVERAL DAYS
4. TROUBLE RELAXING: NOT AT ALL
5. BEING SO RESTLESS THAT IT IS HARD TO SIT STILL: NOT AT ALL
GAD7 TOTAL SCORE: 1
3. WORRYING TOO MUCH ABOUT DIFFERENT THINGS: NOT AT ALL
GAD7 TOTAL SCORE: 1
7. FEELING AFRAID AS IF SOMETHING AWFUL MIGHT HAPPEN: NOT AT ALL

## 2022-08-24 ASSESSMENT — PATIENT HEALTH QUESTIONNAIRE - PHQ9: SUM OF ALL RESPONSES TO PHQ QUESTIONS 1-9: 0

## 2022-08-24 ASSESSMENT — PAIN SCALES - GENERAL: PAINLEVEL: NO PAIN (0)

## 2022-08-24 NOTE — PATIENT INSTRUCTIONS
Assessment & Plan       Anxiety  - sertraline (ZOLOFT) 50 MG tablet; TAKE 1/2 TO 1 TABLET BY MOUTH EVERY DAY.    SVT (supraventricular tachycardia) (H)  -Continue Toprol XL     External hemorrhoids  - You can try preparation H cream OTC        Return in about 6 months (around 2/24/2023).      Zunilda Matute, NATANAEL  [facility] - [place] [facility]

## 2022-08-24 NOTE — PROGRESS NOTES
Assessment & Plan       Anxiety  - sertraline (ZOLOFT) 50 MG tablet; TAKE 1/2 TO 1 TABLET BY MOUTH EVERY DAY.    SVT (supraventricular tachycardia) (H)  -Continue Toprol XL     External hemorrhoids  - You can try preparation H cream OTC        Return in about 6 months (around 2023).      Zunilda Matute CNP  [facility] - [place] Chavarria is a 41 year old, presenting for the following health issues:  Anxiety      Anxiety Follow-Up    How are you doing with your anxiety since your last visit? No change    Are you having other symptoms that might be associated with anxiety? No    Have you had a significant life event? No     Are you feeling depressed? No    Do you have any concerns with your use of alcohol or other drugs? No      Social History     Tobacco Use     Smoking status: Never Smoker     Smokeless tobacco: Never Used   Substance Use Topics     Alcohol use: Yes     Comment: occasional     Drug use: No         LUZ-7 SCORE 2020   Total Score 0 0 1       PHQ 2020   PHQ-9 Total Score 1 1 0   Q9: Thoughts of better off dead/self-harm past 2 weeks Not at all Not at all Not at all         History of PVC's, SVT - on Toprol XL  Stable  Occasionally notes a PVC  No lightheadedness  No dizziness  No chest pain or SOB        Perianal lump  She has noted this over time and would like to have this looked at  Not painful  Present for less than 1 year      Patient Active Problem List   Diagnosis     Family history of melanoma     Fibrocystic breast changes     SVT (supraventricular tachycardia) (H)     Palpitations     PAC (premature atrial contraction)     PVC's (premature ventricular contractions)     Paroxysmal supraventricular tachycardia (H)     Anxiety     History of panic attacks     Past Surgical History:   Procedure Laterality Date     APPENDECTOMY      [place]     C/SECTION, CLASSICAL      x's 3      SECTION  2015    laparotomy      CHOLECYSTECTOMY       GALLBLADDER SURGERY  2005    [facility]     wisdom teeth         Social History     Tobacco Use     Smoking status: Never Smoker     Smokeless tobacco: Never Used   Substance Use Topics     Alcohol use: Yes     Comment: occasional     Family History   Problem Relation Age of Onset     Hypertension Father      Diabetes Maternal Grandmother      Alzheimer Disease Maternal Grandfather      Heart Disease Paternal Grandmother      Heart Disease Paternal Grandfather      Breast Cancer Maternal Aunt         50's     Colon Cancer Maternal Aunt         early 70's             Current Outpatient Medications   Medication Sig Dispense Refill     CHOLECALCIFEROL PO Take 2,000 Units by mouth       metoprolol succinate ER (TOPROL-XL) 25 MG 24 hr tablet Take 1 tablet (25 mg) by mouth daily 90 tablet 3     multivitamin w/minerals (MULTI-VITAMIN) tablet Take 1 tablet by mouth daily       sertraline (ZOLOFT) 50 MG tablet TAKE 1/2 TO 1 TABLET BY MOUTH EVERY DAY. 90 tablet 1         No Known Allergies       Recent Labs   Lab Test 02/25/22  1559 12/28/20  1031 10/18/19  0844 09/30/19  1558   A1C  --   --   --  5.1   ALT 17 44 23 15   CR 0.64 0.67 0.70 0.72   GFRESTIMATED >90 >90 >90 >90   GFRESTBLACK  --  >90 >90 >90   POTASSIUM 3.8 3.8 3.5 4.0   TSH 1.63 1.80 2.28 1.68          BP Readings from Last 3 Encounters:   08/24/22 110/76   02/25/22 112/77   12/28/20 118/70    Wt Readings from Last 3 Encounters:   08/24/22 100.7 kg (222 lb)   02/25/22 98.9 kg (218 lb)   12/28/20 95.3 kg (210 lb)                  Review of Systems   Constitutional, HEENT, cardiovascular, pulmonary, gi and gu systems are negative, except as otherwise noted.        Objective    /76 (BP Location: Left arm, Patient Position: Sitting, Cuff Size: Adult Large)   Pulse 71   Temp 97.7  F (36.5  C) (Tympanic)   Resp 18   Wt 100.7 kg (222 lb)   SpO2 99%   BMI 36.94 kg/m    Body mass index is 36.94 kg/m .         Physical Exam    GENERAL: healthy, alert and no distress  EYES: Eyes grossly normal to inspection, PERRL and conjunctivae and sclerae normal  HENT: ear canals and TM's normal, nose and mouth without ulcers or lesions  NECK: no adenopathy, no asymmetry, masses, or scars and thyroid normal to palpation  RESP: lungs clear to auscultation - no rales, rhonchi or wheezes  CV: regular rate and rhythm, normal S1 S2, no S3 or S4, no murmur, click or rub, no peripheral edema and peripheral pulses strong  Rectal - small non-thrombosed external hemorrhoid, nontender  PSYCH: mentation appears normal, affect normal/bright

## 2022-08-24 NOTE — NURSING NOTE
"Chief Complaint   Patient presents with     Anxiety       Initial /76 (BP Location: Left arm, Patient Position: Sitting, Cuff Size: Adult Large)   Pulse 71   Temp 97.7  F (36.5  C) (Tympanic)   Resp 18   Wt 100.7 kg (222 lb)   SpO2 99%   BMI 36.94 kg/m   Estimated body mass index is 36.94 kg/m  as calculated from the following:    Height as of 12/28/20: 1.651 m (5' 5\").    Weight as of this encounter: 100.7 kg (222 lb).  Medication Reconciliation: complete  Fatou Lugo LPN  "

## 2022-09-04 ENCOUNTER — HEALTH MAINTENANCE LETTER (OUTPATIENT)
Age: 42
End: 2022-09-04

## 2023-01-20 DIAGNOSIS — R00.2 PALPITATIONS: ICD-10-CM

## 2023-01-20 DIAGNOSIS — I49.3 PVC'S (PREMATURE VENTRICULAR CONTRACTIONS): ICD-10-CM

## 2023-01-20 DIAGNOSIS — I49.1 PAC (PREMATURE ATRIAL CONTRACTION): ICD-10-CM

## 2023-01-20 DIAGNOSIS — I47.10 SVT (SUPRAVENTRICULAR TACHYCARDIA) (H): ICD-10-CM

## 2023-01-20 DIAGNOSIS — I47.10 PAROXYSMAL SUPRAVENTRICULAR TACHYCARDIA (H): ICD-10-CM

## 2023-01-20 RX ORDER — METOPROLOL SUCCINATE 25 MG/1
25 TABLET, EXTENDED RELEASE ORAL DAILY
Qty: 90 TABLET | Refills: 3 | Status: SHIPPED | OUTPATIENT
Start: 2023-01-20 | End: 2024-01-31

## 2023-01-20 NOTE — TELEPHONE ENCOUNTER
Metoprolol      Last Written Prescription Date:  2/25/22  Last Fill Quantity: 90,   # refills: 3  Last Office Visit: 8/24/22  Future Office visit:    Next 5 appointments (look out 90 days)    Feb 28, 2023  3:45 PM  (Arrive by 3:30 PM)  SHORT with Zunilda Matute CNP  [facility] [address]  [facility] [address]  [phone]           Routing refill request to provider for review/approval because:

## 2023-02-28 ENCOUNTER — OFFICE VISIT (OUTPATIENT)
Dept: FAMILY MEDICINE | Facility: OTHER | Age: 43
End: 2023-02-28
Attending: NURSE PRACTITIONER
Payer: COMMERCIAL

## 2023-02-28 VITALS
OXYGEN SATURATION: 100 % | HEART RATE: 69 BPM | SYSTOLIC BLOOD PRESSURE: 120 MMHG | BODY MASS INDEX: 37.28 KG/M2 | WEIGHT: 224 LBS | RESPIRATION RATE: 18 BRPM | DIASTOLIC BLOOD PRESSURE: 80 MMHG | TEMPERATURE: 97.9 F

## 2023-02-28 DIAGNOSIS — Z12.31 ENCOUNTER FOR SCREENING MAMMOGRAM FOR MALIGNANT NEOPLASM OF BREAST: ICD-10-CM

## 2023-02-28 DIAGNOSIS — F41.9 ANXIETY: Primary | ICD-10-CM

## 2023-02-28 DIAGNOSIS — R00.2 PALPITATIONS: ICD-10-CM

## 2023-02-28 DIAGNOSIS — I47.10 SVT (SUPRAVENTRICULAR TACHYCARDIA) (H): ICD-10-CM

## 2023-02-28 LAB — HOLD SPECIMEN: NORMAL

## 2023-02-28 PROCEDURE — 84443 ASSAY THYROID STIM HORMONE: CPT | Performed by: NURSE PRACTITIONER

## 2023-02-28 PROCEDURE — 36415 COLL VENOUS BLD VENIPUNCTURE: CPT | Performed by: NURSE PRACTITIONER

## 2023-02-28 PROCEDURE — 99214 OFFICE O/P EST MOD 30 MIN: CPT | Performed by: NURSE PRACTITIONER

## 2023-02-28 PROCEDURE — 80053 COMPREHEN METABOLIC PANEL: CPT | Performed by: NURSE PRACTITIONER

## 2023-02-28 ASSESSMENT — ANXIETY QUESTIONNAIRES
GAD7 TOTAL SCORE: 0
4. TROUBLE RELAXING: NOT AT ALL
2. NOT BEING ABLE TO STOP OR CONTROL WORRYING: NOT AT ALL
3. WORRYING TOO MUCH ABOUT DIFFERENT THINGS: NOT AT ALL
GAD7 TOTAL SCORE: 0
1. FEELING NERVOUS, ANXIOUS, OR ON EDGE: NOT AT ALL
6. BECOMING EASILY ANNOYED OR IRRITABLE: NOT AT ALL
7. FEELING AFRAID AS IF SOMETHING AWFUL MIGHT HAPPEN: NOT AT ALL
5. BEING SO RESTLESS THAT IT IS HARD TO SIT STILL: NOT AT ALL

## 2023-02-28 ASSESSMENT — PAIN SCALES - GENERAL: PAINLEVEL: NO PAIN (0)

## 2023-02-28 NOTE — PROGRESS NOTES
Assessment & Plan          Anxiety  - sertraline (ZOLOFT) 50 MG tablet; TAKE 1/2 TO 1 TABLET BY MOUTH EVERY DAY.        SVT (supraventricular tachycardia) (H)  - Comprehensive metabolic panel; Future  - TSH with free T4 reflex; Future        Palpitations  - Comprehensive metabolic panel; Future  - TSH with free T4 reflex; Future        Mammogram due in April, ordered        When your lab results return, we will call you with abnormal findings  You can also view this information in your MyChart, if you have an account.  Please call or [facility] message us with any questions you may have.           Zunilda Matute, CNP  [facility] VICKEY Chavarria is a 42 year old, presenting for the following health issues:  Anxiety        Anxiety Follow-Up    How are you doing with your anxiety since your last visit? Improved     Are you having other symptoms that might be associated with anxiety? No    Have you had a significant life event? No     Are you feeling depressed? No    Do you have any concerns with your use of alcohol or other drugs? No      - continue plan of care  - Zoloft refilled        Social History     Tobacco Use     Smoking status: Never     Smokeless tobacco: Never   Substance Use Topics     Alcohol use: Yes     Comment: occasional     Drug use: No         LUZ-7 SCORE 2/25/2022 8/24/2022 2/28/2023   Total Score 0 1 0         PHQ 12/28/2020 2/25/2022 8/24/2022   PHQ-9 Total Score 1 1 0   Q9: Thoughts of better off dead/self-harm past 2 weeks Not at all Not at all Not at all             SVT / Irregular heart rate  - On Toprol XL  - reports stable, no new symptoms or episodes   - continue plan of care          Patient Active Problem List   Diagnosis     Family history of melanoma     Fibrocystic breast changes     SVT (supraventricular tachycardia) (H)     Palpitations     PAC (premature atrial contraction)     PVC's (premature ventricular contractions)     Paroxysmal supraventricular tachycardia  (H)     Anxiety     History of panic attacks     Past Surgical History:   Procedure Laterality Date     APPENDECTOMY  2009    [place]     C/SECTION, CLASSICAL      x's 3      SECTION  2015    laparotomy     CHOLECYSTECTOMY       [facility]         Social History     Tobacco Use     Smoking status: Never     Smokeless tobacco: Never   Substance Use Topics     Alcohol use: Yes     Comment: occasional     Family History   Problem Relation Age of Onset     Hypertension Father      Diabetes Maternal Grandmother      Alzheimer Disease Maternal Grandfather      Heart Disease Paternal Grandmother      Heart Disease Paternal Grandfather      Breast Cancer Maternal Aunt         50's     Colon Cancer Maternal Aunt         early 70's               Current Outpatient Medications   Medication Sig Dispense Refill     CHOLECALCIFEROL PO Take 2,000 Units by mouth       metoprolol succinate ER (TOPROL XL) 25 MG 24 hr tablet Take 1 tablet (25 mg) by mouth daily 90 tablet 3     multivitamin w/minerals (THERA-VIT-M) tablet Take 1 tablet by mouth daily       sertraline (ZOLOFT) 50 MG tablet TAKE 1/2 TO 1 TABLET BY MOUTH EVERY DAY. 90 tablet 1         No Known Allergies         Recent Labs   Lab Test 22  1559 20  1031 10/18/19  0844 19  1558   A1C  --   --   --  5.1   ALT 17 44 23 15   CR 0.64 0.67 0.70 0.72   GFRESTIMATED >90 >90 >90 >90   GFRESTBLACK  --  >90 >90 >90   POTASSIUM 3.8 3.8 3.5 4.0   TSH 1.63 1.80 2.28 1.68            BP Readings from Last 3 Encounters:   23 120/80   22 110/76   22 112/77    Wt Readings from Last 3 Encounters:   23 101.6 kg (224 lb)   22 100.7 kg (222 lb)   22 98.9 kg (218 lb)                Review of Systems   Constitutional, HEENT, cardiovascular, pulmonary, gi and gu systems are negative, except as otherwise noted.            Objective    /80 (BP Location: Right arm, Patient  Position: Sitting, Cuff Size: Adult Large)   Pulse 69   Temp 97.9  F (36.6  C) (Tympanic)   Resp 18   Wt 101.6 kg (224 lb)   SpO2 100%   BMI 37.28 kg/m    Body mass index is 37.28 kg/m .           Physical Exam   GENERAL: healthy, alert and no distress  EYES: Eyes grossly normal to inspection, PERRL and conjunctivae and sclerae normal  HENT: ear canals and TM's normal, nose and mouth without ulcers or lesions  NECK: no adenopathy, no asymmetry, masses, or scars and thyroid normal to palpation  RESP: lungs clear to auscultation - no rales, rhonchi or wheezes  CV: regular rate and rhythm, normal S1 S2, no S3 or S4, no murmur, click or rub, no peripheral edema and peripheral pulses strong  ABDOMEN: soft, nontender, no hepatosplenomegaly, no masses and bowel sounds normal  PSYCH: mentation appears normal, affect normal/bright        Patient is seen in conjunction with NP student.  History is reviewed with patient and pertinent portions of the exam are repeated.  Assessment and plan is reviewed with the patient.        Bel Carter, NP Student

## 2023-02-28 NOTE — PATIENT INSTRUCTIONS
Assessment & Plan          Anxiety  - sertraline (ZOLOFT) 50 MG tablet; TAKE 1/2 TO 1 TABLET BY MOUTH EVERY DAY.        SVT (supraventricular tachycardia) (H)  - Comprehensive metabolic panel; Future  - TSH with free T4 reflex; Future        Palpitations  - Comprehensive metabolic panel; Future  - TSH with free T4 reflex; Future        Mammogram due in April, ordered        When your lab results return, we will call you with abnormal findings  You can also view this information in your MyChart, if you have an account.  Please call or [facility] message us with any questions you may have.           Zunilda Matute, CNP  [facility]

## 2023-03-01 LAB
ALBUMIN SERPL BCG-MCNC: 4.4 G/DL (ref 3.5–5.2)
ALP SERPL-CCNC: 64 U/L (ref 35–104)
ALT SERPL W P-5'-P-CCNC: 13 U/L (ref 10–35)
ANION GAP SERPL CALCULATED.3IONS-SCNC: 12 MMOL/L (ref 7–15)
AST SERPL W P-5'-P-CCNC: 19 U/L (ref 10–35)
BILIRUB SERPL-MCNC: 0.3 MG/DL
BUN SERPL-MCNC: 8.7 MG/DL (ref 6–20)
CALCIUM SERPL-MCNC: 9.7 MG/DL (ref 8.6–10)
CHLORIDE SERPL-SCNC: 101 MMOL/L (ref 98–107)
CREAT SERPL-MCNC: 0.64 MG/DL (ref 0.51–0.95)
DEPRECATED HCO3 PLAS-SCNC: 24 MMOL/L (ref 22–29)
GFR SERPL CREATININE-BSD FRML MDRD: >90 ML/MIN/1.73M2
GLUCOSE SERPL-MCNC: 93 MG/DL (ref 70–99)
POTASSIUM SERPL-SCNC: 4 MMOL/L (ref 3.4–5.3)
PROT SERPL-MCNC: 7.5 G/DL (ref 6.4–8.3)
SODIUM SERPL-SCNC: 137 MMOL/L (ref 136–145)
TSH SERPL DL<=0.005 MIU/L-ACNC: 1.49 UIU/ML (ref 0.3–4.2)

## 2023-04-11 DIAGNOSIS — F41.9 ANXIETY: ICD-10-CM

## 2023-05-16 ENCOUNTER — ANCILLARY PROCEDURE (OUTPATIENT)
Dept: MAMMOGRAPHY | Facility: OTHER | Age: 43
End: 2023-05-16
Attending: NURSE PRACTITIONER
Payer: COMMERCIAL

## 2023-05-16 DIAGNOSIS — Z12.31 ENCOUNTER FOR SCREENING MAMMOGRAM FOR MALIGNANT NEOPLASM OF BREAST: ICD-10-CM

## 2023-05-16 PROCEDURE — 77063 BREAST TOMOSYNTHESIS BI: CPT | Mod: TC | Performed by: RADIOLOGY

## 2023-05-16 PROCEDURE — 77067 SCR MAMMO BI INCL CAD: CPT | Mod: TC | Performed by: RADIOLOGY

## 2023-05-16 NOTE — LETTER
May 23, 2023      Aura ASHLEY Kristine  [address] [facility] [place] [address]        Dear ,    We are writing to inform you of your test results.    Normal mammogram     Resulted Orders   MA Screen Bilateral w/Jeramy    Narrative    BILATERAL FULL FIELD DIGITAL SCREENING MAMMOGRAM WITH TOMOSYNTHESIS    Performed on: 5/16/23    Compared to: 04/11/2022 and 02/22/2021    Technique:  This study was evaluated with the assistance of Computer-Aided   Detection.  Breast Tomosynthesis was used in interpretation.    Findings: The breasts have scattered areas of fibroglandular density.    There is no radiographic evidence of malignancy.     Impression    IMPRESSION: ACR BI-RADS Category 1: Negative    RECOMMENDED FOLLOW-UP: Annual routine screening mammogram    The results and recommendations of this examination will be communicated   to the patient.        Efrain Gutierrez MD         If you have any questions or concerns, please call the clinic at the number listed above.       Sincerely,      Zunilda Matute, CNP

## 2023-05-17 ENCOUNTER — TELEPHONE (OUTPATIENT)
Dept: MAMMOGRAPHY | Facility: OTHER | Age: 43
End: 2023-05-17

## 2023-06-03 ENCOUNTER — HEALTH MAINTENANCE LETTER (OUTPATIENT)
Age: 43
End: 2023-06-03

## 2024-01-31 DIAGNOSIS — R00.2 PALPITATIONS: ICD-10-CM

## 2024-01-31 DIAGNOSIS — I49.3 PVC'S (PREMATURE VENTRICULAR CONTRACTIONS): ICD-10-CM

## 2024-01-31 DIAGNOSIS — I47.10 PAROXYSMAL SUPRAVENTRICULAR TACHYCARDIA (H): ICD-10-CM

## 2024-01-31 DIAGNOSIS — I47.10 SVT (SUPRAVENTRICULAR TACHYCARDIA) (H): ICD-10-CM

## 2024-01-31 DIAGNOSIS — I49.1 PAC (PREMATURE ATRIAL CONTRACTION): ICD-10-CM

## 2024-02-01 RX ORDER — METOPROLOL SUCCINATE 25 MG/1
25 TABLET, EXTENDED RELEASE ORAL DAILY
Qty: 90 TABLET | OUTPATIENT
Start: 2024-02-01

## 2024-02-06 ENCOUNTER — OFFICE VISIT (OUTPATIENT)
Dept: FAMILY MEDICINE | Facility: OTHER | Age: 44
End: 2024-02-06
Attending: NURSE PRACTITIONER
Payer: COMMERCIAL

## 2024-02-06 VITALS
SYSTOLIC BLOOD PRESSURE: 121 MMHG | DIASTOLIC BLOOD PRESSURE: 85 MMHG | WEIGHT: 219.2 LBS | TEMPERATURE: 98.4 F | HEART RATE: 70 BPM | RESPIRATION RATE: 16 BRPM | OXYGEN SATURATION: 98 % | BODY MASS INDEX: 36.48 KG/M2

## 2024-02-06 DIAGNOSIS — F41.9 ANXIETY: Primary | ICD-10-CM

## 2024-02-06 DIAGNOSIS — I47.10 PAROXYSMAL SUPRAVENTRICULAR TACHYCARDIA (H): ICD-10-CM

## 2024-02-06 PROBLEM — I49.3 PVC'S (PREMATURE VENTRICULAR CONTRACTIONS): Status: RESOLVED | Noted: 2020-01-16 | Resolved: 2024-02-06

## 2024-02-06 PROCEDURE — 99213 OFFICE O/P EST LOW 20 MIN: CPT | Performed by: NURSE PRACTITIONER

## 2024-02-06 RX ORDER — METOPROLOL SUCCINATE 25 MG/1
25 TABLET, EXTENDED RELEASE ORAL DAILY
Qty: 30 TABLET | Refills: 0 | Status: SHIPPED | OUTPATIENT
Start: 2024-02-06 | End: 2024-03-19

## 2024-02-06 ASSESSMENT — ANXIETY QUESTIONNAIRES
2. NOT BEING ABLE TO STOP OR CONTROL WORRYING: NOT AT ALL
6. BECOMING EASILY ANNOYED OR IRRITABLE: NOT AT ALL
5. BEING SO RESTLESS THAT IT IS HARD TO SIT STILL: NOT AT ALL
GAD7 TOTAL SCORE: 1
GAD7 TOTAL SCORE: 1
7. FEELING AFRAID AS IF SOMETHING AWFUL MIGHT HAPPEN: NOT AT ALL
1. FEELING NERVOUS, ANXIOUS, OR ON EDGE: SEVERAL DAYS
7. FEELING AFRAID AS IF SOMETHING AWFUL MIGHT HAPPEN: NOT AT ALL
GAD7 TOTAL SCORE: 1
8. IF YOU CHECKED OFF ANY PROBLEMS, HOW DIFFICULT HAVE THESE MADE IT FOR YOU TO DO YOUR WORK, TAKE CARE OF THINGS AT HOME, OR GET ALONG WITH OTHER PEOPLE?: NOT DIFFICULT AT ALL
4. TROUBLE RELAXING: NOT AT ALL
IF YOU CHECKED OFF ANY PROBLEMS ON THIS QUESTIONNAIRE, HOW DIFFICULT HAVE THESE PROBLEMS MADE IT FOR YOU TO DO YOUR WORK, TAKE CARE OF THINGS AT HOME, OR GET ALONG WITH OTHER PEOPLE: NOT DIFFICULT AT ALL
3. WORRYING TOO MUCH ABOUT DIFFERENT THINGS: NOT AT ALL

## 2024-02-06 ASSESSMENT — PAIN SCALES - GENERAL: PAINLEVEL: NO PAIN (0)

## 2024-02-06 ASSESSMENT — PATIENT HEALTH QUESTIONNAIRE - PHQ9
SUM OF ALL RESPONSES TO PHQ QUESTIONS 1-9: 0
10. IF YOU CHECKED OFF ANY PROBLEMS, HOW DIFFICULT HAVE THESE PROBLEMS MADE IT FOR YOU TO DO YOUR WORK, TAKE CARE OF THINGS AT HOME, OR GET ALONG WITH OTHER PEOPLE: NOT DIFFICULT AT ALL
SUM OF ALL RESPONSES TO PHQ QUESTIONS 1-9: 0

## 2024-02-06 NOTE — PATIENT INSTRUCTIONS
Assessment & Plan       Anxiety  - sertraline (ZOLOFT) 50 MG tablet; TAKE 1/2 TO 1 TABLET BY MOUTH EVERY DAY.       Paroxysmal supraventricular tachycardia  - metoprolol succinate ER (TOPROL XL) 25 MG 24 hr tablet; Take 1 tablet (25 mg) by mouth daily      Follow-up in 6 months        [facility]  [phone]

## 2024-02-06 NOTE — PROGRESS NOTES
Assessment & Plan       Anxiety  - sertraline (ZOLOFT) 50 MG tablet; TAKE 1/2 TO 1 TABLET BY MOUTH EVERY DAY.       Paroxysmal supraventricular tachycardia  - metoprolol succinate ER (TOPROL XL) 25 MG 24 hr tablet; Take 1 tablet (25 mg) by mouth daily      Follow-up in 6 months        Zunilda Matute [facility]  [phone]         Aura is a 43 year old, presenting for the following health issues:  Anxiety        Anxiety Follow-Up  How are you doing with your anxiety since your last visit? No change  Are you having other symptoms that might be associated with anxiety? No  Have you had a significant life event? No   Are you feeling depressed? No  Do you have any concerns with your use of alcohol or other drugs? No        Social History     Tobacco Use    Smoking status: Never     Passive exposure: Never    Smokeless tobacco: Never   Vaping Use    Vaping Use: Never used   Substance Use Topics    Alcohol use: Yes     Comment: occasional    Drug use: No             8/24/2022     3:00 PM 2/28/2023     3:00 PM 2/6/2024     3:59 PM   LUZ-7 SCORE   Total Score   1 (minimal anxiety)   Total Score 1 0 1             2/25/2022     3:50 PM 8/24/2022     3:00 PM 2/6/2024     3:58 PM   PHQ   PHQ-9 Total Score 1 0 0   Q9: Thoughts of better off dead/self-harm past 2 weeks Not at all Not at all Not at all             2/6/2024     3:58 PM   Last PHQ-9   1.  Little interest or pleasure in doing things 0   2.  Feeling down, depressed, or hopeless 0   3.  Trouble falling or staying asleep, or sleeping too much 0   4.  Feeling tired or having little energy 0   5.  Poor appetite or overeating 0   6.  Feeling bad about yourself 0   7.  Trouble concentrating 0   8.  Moving slowly or restless 0   Q9: Thoughts of better off dead/self-harm past 2 weeks 0   PHQ-9 Total Score 0             2/6/2024     3:59 PM   LUZ-7    1. Feeling nervous, anxious, or on edge 1   2. Not being able to stop or control worrying 0   3. Worrying too much about different  things 0   4. Trouble relaxing 0   5. Being so restless that it is hard to sit still 0   6. Becoming easily annoyed or irritable 0   7. Feeling afraid, as if something awful might happen 0   LUZ-7 Total Score 1   If you checked any problems, how difficult have they made it for you to do your work, take care of things at home, or get along with other people? Not difficult at all         Answers submitted by the patient for this visit:  Patient Health Questionnaire (Submitted on 2024)  If you checked off any problems, how difficult have these problems made it for you to do your work, take care of things at home, or get along with other people?: Not difficult at all  PHQ9 TOTAL SCORE: 0  LUZ-7 (Submitted on 2024)  LUZ 7 TOTAL SCORE: 1      Patient Active Problem List   Diagnosis    Family history of melanoma    Fibrocystic breast changes    SVT (supraventricular tachycardia)    Palpitations    PAC (premature atrial contraction)    PVC's (premature ventricular contractions)    Paroxysmal supraventricular tachycardia    Anxiety    History of panic attacks     Past Surgical History:   Procedure Laterality Date    APPENDECTOMY  2009    [place]    C/SECTION, CLASSICAL      x's 3     SECTION  2015    laparotomy    CHOLECYSTECTOMY      GALLBLADDER SURGERY  2005    [facility]         Social History     Tobacco Use    Smoking status: Never     Passive exposure: Never    Smokeless tobacco: Never   Substance Use Topics    Alcohol use: Yes     Comment: occasional     Family History   Problem Relation Age of Onset    Hypertension Father     Diabetes Maternal Grandmother     Alzheimer Disease Maternal Grandfather     Heart Disease Paternal Grandmother     Heart Disease Paternal Grandfather     Breast Cancer Maternal Aunt         50's    Colon Cancer Maternal Aunt         early 70's             Current Outpatient Medications   Medication Sig Dispense Refill    CHOLECALCIFEROL PO Take 2,000  Units by mouth      metoprolol succinate ER (TOPROL XL) 25 MG 24 hr tablet TAKE 1 TABLET(25 MG) BY MOUTH DAILY 30 tablet 0    multivitamin w/minerals (THERA-VIT-M) tablet Take 1 tablet by mouth daily      sertraline (ZOLOFT) 50 MG tablet TAKE 1/2 TO 1 TABLET BY MOUTH EVERY DAY. START WITH ONE-HALF TABLET MAY INCREASE TO 1 TABLET AFTER 2 TO 4 WEEKS AS TOLERATED 90 tablet 0           No Known Allergies        Recent Labs   Lab Test 02/28/23  1633 02/25/22  1559 12/28/20  1031 10/18/19  0844 09/30/19  1558   A1C  --   --   --   --  5.1   ALT 13 17 44 23 15   CR 0.64 0.64 0.67 0.70 0.72   GFRESTIMATED >90 >90 >90 >90 >90   GFRESTBLACK  --   --  >90 >90 >90   POTASSIUM 4.0 3.8 3.8 3.5 4.0   TSH 1.49 1.63 1.80 2.28 1.68          BP Readings from Last 3 Encounters:   02/06/24 121/85   02/28/23 120/80   08/24/22 110/76    Wt Readings from Last 3 Encounters:   02/06/24 99.4 kg (219 lb 3.2 oz)   02/28/23 101.6 kg (224 lb)   08/24/22 100.7 kg (222 lb)                  Review of Systems  Constitutional, neuro, ENT, endocrine, pulmonary, cardiac, gastrointestinal, genitourinary, musculoskeletal, integument and psychiatric systems are negative, except as otherwise noted.          Objective    /85 (BP Location: Left arm, Patient Position: Sitting, Cuff Size: Adult Large)   Pulse 70   Temp 98.4  F (36.9  C) (Tympanic)   Resp 16   Wt 99.4 kg (219 lb 3.2 oz)   LMP 02/06/2024   SpO2 98%   Breastfeeding No   BMI 36.48 kg/m    Body mass index is 36.48 kg/m .        Physical Exam   GENERAL: alert and no distress  RESP: lungs clear to auscultation - no rales, rhonchi or wheezes  CV: regular rate and rhythm, normal S1 S2, no S3 or S4, no murmur, click or rub, no peripheral edema  PSYCH: mentation appears normal, affect normal/bright            Signed Electronically by: Zunilda Matute, CNP

## 2024-03-18 DIAGNOSIS — I47.10 PAROXYSMAL SUPRAVENTRICULAR TACHYCARDIA (H): ICD-10-CM

## 2024-03-19 RX ORDER — METOPROLOL SUCCINATE 25 MG/1
25 TABLET, EXTENDED RELEASE ORAL DAILY
Qty: 30 TABLET | Refills: 0 | Status: SHIPPED | OUTPATIENT
Start: 2024-03-19 | End: 2024-04-15

## 2024-03-19 NOTE — TELEPHONE ENCOUNTER
Metoprolol succinate ER 25 mg 24 hr      Last Written Prescription Date:  2-6-24  Last Fill Quantity: 30,   # refills: 0  Last Office Visit: 2-6-24

## 2024-04-13 DIAGNOSIS — I47.10 PAROXYSMAL SUPRAVENTRICULAR TACHYCARDIA (H): ICD-10-CM

## 2024-04-15 RX ORDER — METOPROLOL SUCCINATE 25 MG/1
25 TABLET, EXTENDED RELEASE ORAL DAILY
Qty: 30 TABLET | Refills: 0 | Status: SHIPPED | OUTPATIENT
Start: 2024-04-15 | End: 2024-05-17

## 2024-04-15 NOTE — TELEPHONE ENCOUNTER
Routing refill request to provider for review/approval because:  Beta-Blockers Protocol [id number]/13/2024 11:22 AM   Protocol Details Medication indicated for associated diagnosis

## 2024-04-15 NOTE — TELEPHONE ENCOUNTER
Toprol      Last Written Prescription Date:  3/19/24  Last Fill Quantity: 30,   # refills: 0  Last Office Visit: 2/6/24  Future Office visit:       Routing refill request to provider for review/approval because:

## 2024-05-17 DIAGNOSIS — I47.10 PAROXYSMAL SUPRAVENTRICULAR TACHYCARDIA (H): ICD-10-CM

## 2024-05-17 RX ORDER — METOPROLOL SUCCINATE 25 MG/1
25 TABLET, EXTENDED RELEASE ORAL DAILY
Qty: 30 TABLET | Refills: 0 | Status: SHIPPED | OUTPATIENT
Start: 2024-05-17 | End: 2024-06-19

## 2024-05-17 NOTE — TELEPHONE ENCOUNTER
METOPROLOL ER SUCCINATE 25MG TABS       Last Written Prescription Date:  4/15/24  Last Fill Quantity: 30,   # refills: 0  Last Office Visit: 2/6/24  Future Office visit:    Next 5 appointments (look out 90 days)      Aug 07, 2024  4:00 PM  (Arrive by 3:45 PM)  Adult Preventative Visit with Zunilda Matute CNP  [facility] ([facility] ) [address]  [facility] [address]  [phone]             Routing refill request to provider for review/approval because:    Beta-Blockers Protocol [id number]/17/2024 11:28 AM   Protocol Details Medication indicated for associated diagnosis

## 2024-06-18 DIAGNOSIS — I47.10 PAROXYSMAL SUPRAVENTRICULAR TACHYCARDIA (H): ICD-10-CM

## 2024-06-19 RX ORDER — METOPROLOL SUCCINATE 25 MG/1
25 TABLET, EXTENDED RELEASE ORAL DAILY
Qty: 30 TABLET | Refills: 0 | Status: SHIPPED | OUTPATIENT
Start: 2024-06-19 | End: 2024-07-26

## 2024-06-19 NOTE — TELEPHONE ENCOUNTER
Toprol      Last Written Prescription Date:  5/17/24  Last Fill Quantity: 30,   # refills: 0  Last Office Visit: 2/6/24  Future Office visit:    Next 5 appointments (look out 90 days)      Aug 07, 2024 4:00 PM  (Arrive by 3:45 PM)  Adult Preventative Visit with Zunilda Matute CNP  [facility] ([facility] ) [address]  [facility] [address]  [phone]             Routing refill request to provider for review/approval because:

## 2024-07-07 ENCOUNTER — HEALTH MAINTENANCE LETTER (OUTPATIENT)
Age: 44
End: 2024-07-07

## 2024-07-25 DIAGNOSIS — I47.10 PAROXYSMAL SUPRAVENTRICULAR TACHYCARDIA (H): ICD-10-CM

## 2024-07-26 RX ORDER — METOPROLOL SUCCINATE 25 MG/1
25 TABLET, EXTENDED RELEASE ORAL DAILY
Qty: 30 TABLET | Refills: 6 | Status: SHIPPED | OUTPATIENT
Start: 2024-07-26 | End: 2024-08-07

## 2024-07-26 NOTE — TELEPHONE ENCOUNTER
METOPROLOL ER SUCCINATE 25MG TABS         Last Written Prescription Date:  6/19/24  Last Fill Quantity: 30,   # refills: 0  Last Office Visit: 2/6/24  Future Office visit:    Next 5 appointments (look out 90 days)      Aug 07, 2024 4:00 PM  (Arrive by 3:45 PM)  Adult Preventative Visit with Zunilda Matute CNP  [facility] ([facility] ) [address]  [facility] [address]  [phone]             Routing refill request to provider for review/approval because:      Beta-Blockers Protocol [id number]/25/2024 05:37 PM   Protocol Details Medication indicated for associated diagnosis           25-Feb-2022

## 2024-08-07 ENCOUNTER — OFFICE VISIT (OUTPATIENT)
Dept: FAMILY MEDICINE | Facility: OTHER | Age: 44
End: 2024-08-07
Attending: NURSE PRACTITIONER
Payer: COMMERCIAL

## 2024-08-07 VITALS
OXYGEN SATURATION: 97 % | BODY MASS INDEX: 37.36 KG/M2 | HEART RATE: 75 BPM | HEIGHT: 65 IN | SYSTOLIC BLOOD PRESSURE: 120 MMHG | WEIGHT: 224.2 LBS | RESPIRATION RATE: 18 BRPM | TEMPERATURE: 99.6 F | DIASTOLIC BLOOD PRESSURE: 78 MMHG

## 2024-08-07 DIAGNOSIS — Z12.31 ENCOUNTER FOR SCREENING MAMMOGRAM FOR MALIGNANT NEOPLASM OF BREAST: ICD-10-CM

## 2024-08-07 DIAGNOSIS — F41.9 ANXIETY: ICD-10-CM

## 2024-08-07 DIAGNOSIS — Z12.4 CERVICAL CANCER SCREENING: ICD-10-CM

## 2024-08-07 DIAGNOSIS — I47.10 PAROXYSMAL SUPRAVENTRICULAR TACHYCARDIA (H): ICD-10-CM

## 2024-08-07 DIAGNOSIS — Z00.00 ROUTINE HISTORY AND PHYSICAL EXAMINATION OF ADULT: Primary | ICD-10-CM

## 2024-08-07 LAB
ALBUMIN UR-MCNC: NEGATIVE MG/DL
APPEARANCE UR: CLEAR
BASOPHILS # BLD AUTO: 0 10E3/UL (ref 0–0.2)
BASOPHILS NFR BLD AUTO: 0 %
BILIRUB UR QL STRIP: NEGATIVE
COLOR UR AUTO: YELLOW
EOSINOPHIL # BLD AUTO: 0.2 10E3/UL (ref 0–0.7)
EOSINOPHIL NFR BLD AUTO: 3 %
ERYTHROCYTE [DISTWIDTH] IN BLOOD BY AUTOMATED COUNT: 13.2 % (ref 10–15)
GLUCOSE UR STRIP-MCNC: NEGATIVE MG/DL
HCT VFR BLD AUTO: 36.8 % (ref 35–47)
HGB BLD-MCNC: 12.4 G/DL (ref 11.7–15.7)
HGB UR QL STRIP: NEGATIVE
IMM GRANULOCYTES # BLD: 0 10E3/UL
IMM GRANULOCYTES NFR BLD: 0 %
KETONES UR STRIP-MCNC: NEGATIVE MG/DL
LEUKOCYTE ESTERASE UR QL STRIP: NEGATIVE
LYMPHOCYTES # BLD AUTO: 2.6 10E3/UL (ref 0.8–5.3)
LYMPHOCYTES NFR BLD AUTO: 33 %
MCH RBC QN AUTO: 26.4 PG (ref 26.5–33)
MCHC RBC AUTO-ENTMCNC: 33.7 G/DL (ref 31.5–36.5)
MCV RBC AUTO: 78 FL (ref 78–100)
MONOCYTES # BLD AUTO: 0.6 10E3/UL (ref 0–1.3)
MONOCYTES NFR BLD AUTO: 7 %
NEUTROPHILS # BLD AUTO: 4.5 10E3/UL (ref 1.6–8.3)
NEUTROPHILS NFR BLD AUTO: 56 %
NITRATE UR QL: NEGATIVE
PH UR STRIP: 7 [PH] (ref 5–7)
PLATELET # BLD AUTO: 296 10E3/UL (ref 150–450)
RBC # BLD AUTO: 4.7 10E6/UL (ref 3.8–5.2)
SP GR UR STRIP: 1.01 (ref 1–1.03)
UROBILINOGEN UR STRIP-ACNC: 0.2 E.U./DL
WBC # BLD AUTO: 8 10E3/UL (ref 4–11)

## 2024-08-07 PROCEDURE — 87624 HPV HI-RISK TYP POOLED RSLT: CPT | Performed by: NURSE PRACTITIONER

## 2024-08-07 PROCEDURE — 36415 COLL VENOUS BLD VENIPUNCTURE: CPT | Performed by: NURSE PRACTITIONER

## 2024-08-07 PROCEDURE — 83540 ASSAY OF IRON: CPT | Performed by: NURSE PRACTITIONER

## 2024-08-07 PROCEDURE — 80061 LIPID PANEL: CPT | Performed by: NURSE PRACTITIONER

## 2024-08-07 PROCEDURE — 83550 IRON BINDING TEST: CPT | Performed by: NURSE PRACTITIONER

## 2024-08-07 PROCEDURE — 99396 PREV VISIT EST AGE 40-64: CPT | Performed by: NURSE PRACTITIONER

## 2024-08-07 PROCEDURE — G0123 SCREEN CERV/VAG THIN LAYER: HCPCS | Performed by: NURSE PRACTITIONER

## 2024-08-07 PROCEDURE — 83036 HEMOGLOBIN GLYCOSYLATED A1C: CPT | Performed by: NURSE PRACTITIONER

## 2024-08-07 PROCEDURE — 80050 GENERAL HEALTH PANEL: CPT | Performed by: NURSE PRACTITIONER

## 2024-08-07 PROCEDURE — 82306 VITAMIN D 25 HYDROXY: CPT | Performed by: NURSE PRACTITIONER

## 2024-08-07 PROCEDURE — 81003 URINALYSIS AUTO W/O SCOPE: CPT | Performed by: NURSE PRACTITIONER

## 2024-08-07 PROCEDURE — 82728 ASSAY OF FERRITIN: CPT | Performed by: NURSE PRACTITIONER

## 2024-08-07 RX ORDER — METOPROLOL SUCCINATE 25 MG/1
25 TABLET, EXTENDED RELEASE ORAL DAILY
Qty: 90 TABLET | Refills: 1 | Status: SHIPPED | OUTPATIENT
Start: 2024-08-07

## 2024-08-07 SDOH — HEALTH STABILITY: PHYSICAL HEALTH: ON AVERAGE, HOW MANY DAYS PER WEEK DO YOU ENGAGE IN MODERATE TO STRENUOUS EXERCISE (LIKE A BRISK WALK)?: 1 DAY

## 2024-08-07 ASSESSMENT — ANXIETY QUESTIONNAIRES
3. WORRYING TOO MUCH ABOUT DIFFERENT THINGS: NOT AT ALL
7. FEELING AFRAID AS IF SOMETHING AWFUL MIGHT HAPPEN: NOT AT ALL
5. BEING SO RESTLESS THAT IT IS HARD TO SIT STILL: NOT AT ALL
6. BECOMING EASILY ANNOYED OR IRRITABLE: NOT AT ALL
GAD7 TOTAL SCORE: 0
GAD7 TOTAL SCORE: 0
1. FEELING NERVOUS, ANXIOUS, OR ON EDGE: NOT AT ALL
IF YOU CHECKED OFF ANY PROBLEMS ON THIS QUESTIONNAIRE, HOW DIFFICULT HAVE THESE PROBLEMS MADE IT FOR YOU TO DO YOUR WORK, TAKE CARE OF THINGS AT HOME, OR GET ALONG WITH OTHER PEOPLE: NOT DIFFICULT AT ALL
2. NOT BEING ABLE TO STOP OR CONTROL WORRYING: NOT AT ALL

## 2024-08-07 ASSESSMENT — PAIN SCALES - GENERAL: PAINLEVEL: NO PAIN (0)

## 2024-08-07 ASSESSMENT — SOCIAL DETERMINANTS OF HEALTH (SDOH): HOW OFTEN DO YOU GET TOGETHER WITH FRIENDS OR RELATIVES?: ONCE A WEEK

## 2024-08-07 ASSESSMENT — PATIENT HEALTH QUESTIONNAIRE - PHQ9
SUM OF ALL RESPONSES TO PHQ QUESTIONS 1-9: 0
5. POOR APPETITE OR OVEREATING: NOT AT ALL

## 2024-08-07 NOTE — PROGRESS NOTES
Preventive Care Visit  [facility]  Zunilda Matute CNP, Family Medicine        Aug 7, 2024        Assessment & Plan       Routine history and physical examination of adult  - Comprehensive metabolic panel; Future  - Lipid Profile (Chol, Trig, HDL, LDL calc); Future  - TSH with free T4 reflex; Future  - CBC with platelets and differential; Future  - UA Macroscopic with reflex to Microscopic and Culture; Future  - Hemoglobin A1c; Future  - CBC with platelets and differential; Future  - Iron and iron binding capacity; Future  - Ferritin; Future  - Vitamin D Deficiency; Future      Anxiety  - Continue plan of care      Paroxysmal supraventricular tachycardia (H24)  - metoprolol succinate ER (TOPROL XL) 25 MG 24 hr tablet; Take 1 tablet (25 mg) by mouth daily      Encounter for screening mammogram for malignant neoplasm of breast  - MA Screen Bilateral w/Jeramy; Future      Cervical cancer screening  - Gynecologic Cytology (Pap) and HPV (Ages 30-65)        Return in about 6 months (around 2/7/2025).      Please continue to take all medication as directed  Please notify your pharmacy or our refill line of future refills needed.  Please return sooner than your next scheduled appointment with any concerns.      When your lab results return, we will call you with abnormal findings  You can also view this information in your MyChart, if you have an account.  Please call or [employer] message us with any questions you may have.          Zunilda Matute [facility]  [phone]           Aura is a 43 year old, presenting for the following:  Physical         Health Care Directive  Patient does not have a Health Care Directive or Living Will: Discussed advance care planning with patient; however, patient declined at this time.          Anxiety   How are you doing with your anxiety since your last visit? No change  Are you having other symptoms that might be associated with anxiety? No  Have you had a significant life event? No   Are you  feeling depressed? No  Do you have any concerns with your use of alcohol or other drugs? No        SVT  Stable  On a beta blocker        Social History     Tobacco Use    Smoking status: Never     Passive exposure: Never    Smokeless tobacco: Never   Vaping Use    Vaping status: Never Used   Substance Use Topics    Alcohol use: Yes     Comment: occasional    Drug use: No             2/28/2023     3:00 PM 2/6/2024     3:59 PM 8/7/2024     4:20 PM   LUZ-7 SCORE   Total Score  1 (minimal anxiety)    Total Score 0 1 0             8/24/2022     3:00 PM 2/6/2024     3:58 PM 8/7/2024     4:20 PM   PHQ   PHQ-9 Total Score 0 0 0   Q9: Thoughts of better off dead/self-harm past 2 weeks Not at all Not at all Not at all             8/7/2024     4:20 PM   Last PHQ-9   1.  Little interest or pleasure in doing things 0   2.  Feeling down, depressed, or hopeless 0   3.  Trouble falling or staying asleep, or sleeping too much 0   4.  Feeling tired or having little energy 0   5.  Poor appetite or overeating 0   6.  Feeling bad about yourself 0   7.  Trouble concentrating 0   8.  Moving slowly or restless 0   Q9: Thoughts of better off dead/self-harm past 2 weeks 0   PHQ-9 Total Score 0   Difficulty at work, home, or with people Not difficult at all             8/7/2024     4:20 PM   LUZ-7    1. Feeling nervous, anxious, or on edge 0   2. Not being able to stop or control worrying 0   3. Worrying too much about different things 0   4. Trouble relaxing 0   5. Being so restless that it is hard to sit still 0   6. Becoming easily annoyed or irritable 0   7. Feeling afraid, as if something awful might happen 0   LUZ-7 Total Score 0   If you checked any problems, how difficult have they made it for you to do your work, take care of things at home, or get along with other people? Not difficult at all             8/7/2024   General Health   How would you rate your overall physical health? Good   Feel stress (tense, anxious, or unable to  sleep) Not at all            8/7/2024   Nutrition   Three or more servings of calcium each day? Yes   Diet: Regular (no restrictions)   How many servings of fruit and vegetables per day? (!) 2-3   How many sweetened beverages each day? (!) 2            8/7/2024   Exercise   Days per week of moderate/strenous exercise 1 day      (!) EXERCISE CONCERN      8/7/2024   Social Factors   Frequency of gathering with friends or relatives Once a week   Worry food won't last until get money to buy more No   Food not last or not have enough money for food? No   Do you have housing? (Housing is defined as stable permanent housing and does not include staying ouside in a car, in a tent, in an abandoned building, in an overnight shelter, or couch-surfing.) Yes   Are you worried about losing your housing? No   Lack of transportation? No   Unable to get utilities (heat,electricity)? No            8/7/2024   Dental   Dentist two times every year? Yes            8/7/2024   TB Screening   Were you born outside of the [place]? No                8/7/2024   Substance Use   Alcohol more than 3/day or more than 7/wk No   Do you use any other substances recreationally? No        Social History     Tobacco Use    Smoking status: Never     Passive exposure: Never    Smokeless tobacco: Never   Vaping Use    Vaping status: Never Used   Substance Use Topics    Alcohol use: Yes     Comment: occasional    Drug use: No             5/16/2023   LAST FHS-7 RESULTS   1st degree relative breast or ovarian cancer No   Any relative bilateral breast cancer No   Any male have breast cancer No   Any ONE woman have BOTH breast AND ovarian cancer No   Any woman with breast cancer before 50yrs No   2 or more relatives with breast AND/OR ovarian cancer No   2 or more relatives with breast AND/OR bowel cancer No          Mammogram due - ordered            8/7/2024   STI Screening   New sexual partner(s) since last STI/HIV test? No        History of abnormal Pap smear:  No - age 30-64 HPV with reflex Pap every 5 years recommended        Latest Ref Rng & Units 2018    10:58 AM 2013    12:00 AM   PAP / HPV   PAP (Historical)  NIL  NIL    HPV 16 DNA NEG^Negative Negative     HPV 18 DNA NEG^Negative Negative     Other HR HPV NEG^Negative Negative               ASCVD Risk   The ASCVD Risk score (Timi FERMIN, et al., 2019) failed to calculate for the following reasons:    Cannot find a previous HDL lab    Cannot find a previous total cholesterol lab            2024   Contraception/Family Planning   Questions about contraception or family planning No            Past Medical History:   Diagnosis Date    Fibrocystic breast changes 2018         Past Surgical History:   Procedure Laterality Date    APPENDECTOMY      [facility]    C/SECTION, CLASSICAL      x's 3     SECTION  2015    laparotomy    CHOLECYSTECTOMY      GALLBLADDER SURGERY      [facility]    wisdom teeth           OB History    Para Term  AB Living   3 3 3 0 0 0   SAB IAB Ectopic Multiple Live Births   0 0 0 0 0      # Outcome Date GA Lbr Jonnathan/2nd Weight Sex Type Anes PTL Lv   3 Term            2 Term            1 Term                  Lab work is in process  Labs reviewed in EPIC  BP Readings from Last 3 Encounters:   24 120/78   24 121/85   23 120/80    Wt Readings from Last 3 Encounters:   24 101.7 kg (224 lb 3.2 oz)   24 99.4 kg (219 lb 3.2 oz)   23 101.6 kg (224 lb)                  Patient Active Problem List   Diagnosis    Family history of melanoma    Fibrocystic breast changes    Palpitations    PAC (premature atrial contraction)    Paroxysmal supraventricular tachycardia (H24)    Anxiety    History of panic attacks     Past Surgical History:   Procedure Laterality Date    APPENDECTOMY      [facility]    C/SECTION, CLASSICAL      x's 3     SECTION  2015    laparotomy    CHOLECYSTECTOMY      GALLBLADDER  "SURGERY  2005    [facility]    wisdom teeth         Social History     Tobacco Use    Smoking status: Never     Passive exposure: Never    Smokeless tobacco: Never   Substance Use Topics    Alcohol use: Yes     Comment: occasional     Family History   Problem Relation Age of Onset    Hypertension Father     Diabetes Maternal Grandmother     Alzheimer Disease Maternal Grandfather     Heart Disease Paternal Grandmother     Heart Disease Paternal Grandfather     Breast Cancer Maternal Aunt         50's    Colon Cancer Maternal Aunt         early 70's             Current Outpatient Medications   Medication Sig Dispense Refill    CHOLECALCIFEROL PO Take 2,000 Units by mouth      metoprolol succinate ER (TOPROL XL) 25 MG 24 hr tablet Take 1 tablet (25 mg) by mouth daily 90 tablet 1    multivitamin w/minerals (THERA-VIT-M) tablet Take 1 tablet by mouth daily      sertraline (ZOLOFT) 50 MG tablet TAKE 1/2 TO 1 TABLET BY MOUTH EVERY DAY. START WITH ONE-HALF TABLET MAY INCREASE TO 1 TABLET AFTER 2 TO 4 WEEKS AS TOLERATED 90 tablet 0         No Known Allergies        Recent Labs   Lab Test 02/28/23  1633 02/25/22  1559 12/28/20  1031 10/18/19  0844 09/30/19  1558   A1C  --   --   --   --  5.1   ALT 13 17 44 23 15   CR 0.64 0.64 0.67 0.70 0.72   GFRESTIMATED >90 >90 >90 >90 >90   GFRESTBLACK  --   --  >90 >90 >90   POTASSIUM 4.0 3.8 3.8 3.5 4.0   TSH 1.49 1.63 1.80 2.28 1.68              Review of Systems  Constitutional, HEENT, cardiovascular, pulmonary, GI, , musculoskeletal, neuro, skin, endocrine and psych systems are negative, except as otherwise noted.         Objective    Exam  /78 (BP Location: Left arm, Patient Position: Sitting, Cuff Size: Adult Large)   Pulse 75   Temp 99.6  F (37.6  C) (Tympanic)   Resp 18   Ht 1.651 m (5' 5\")   Wt 101.7 kg (224 lb 3.2 oz)   SpO2 97%   BMI 37.31 kg/m     Estimated body mass index is 37.31 kg/m  as calculated from the following:    Height as of this encounter: " "1.651 m (5' 5\").    Weight as of this encounter: 101.7 kg (224 lb 3.2 oz).        Physical Exam  GENERAL: alert and no distress  EYES: Eyes grossly normal to inspection, PERRL and conjunctivae and sclerae normal  HENT: ear canals and TM's normal, nose and mouth without ulcers or lesions  NECK: no adenopathy, no asymmetry, masses, or scars  RESP: lungs clear to auscultation - no rales, rhonchi or wheezes  BREAST: normal without masses, tenderness or nipple discharge and no palpable axillary masses or adenopathy  CV: regular rate and rhythm, normal S1 S2, no S3 or S4, no murmur, click or rub, no peripheral edema  ABDOMEN: soft, nontender, no hepatosplenomegaly, no masses and bowel sounds normal  MS: no gross musculoskeletal defects noted, no edema  SKIN: no suspicious lesions or rashes  PSYCH: mentation appears normal, affect normal/bright        Signed Electronically by: Zunilda Matute CNP    "

## 2024-08-07 NOTE — PATIENT INSTRUCTIONS
Assessment & Plan       Routine history and physical examination of adult  - Comprehensive metabolic panel; Future  - Lipid Profile (Chol, Trig, HDL, LDL calc); Future  - TSH with free T4 reflex; Future  - CBC with platelets and differential; Future  - UA Macroscopic with reflex to Microscopic and Culture; Future  - Hemoglobin A1c; Future  - CBC with platelets and differential; Future  - Iron and iron binding capacity; Future  - Ferritin; Future  - Vitamin D Deficiency; Future      Anxiety  - Continue plan of care      Paroxysmal supraventricular tachycardia (H24)  - metoprolol succinate ER (TOPROL XL) 25 MG 24 hr tablet; Take 1 tablet (25 mg) by mouth daily      Encounter for screening mammogram for malignant neoplasm of breast  - MA Screen Bilateral w/Jeramy; Future      Cervical cancer screening  - Gynecologic Cytology (Pap) and HPV (Ages 30-65)        Return in about 6 months (around 2/7/2025).      Please continue to take all medication as directed  Please notify your pharmacy or our refill line of future refills needed.  Please return sooner than your next scheduled appointment with any concerns.      When your lab results return, we will call you with abnormal findings  You can also view this information in your MyChart, if you have an account.  Please call or [employer] message us with any questions you may have.          Zunilda Matute [facility]  [phone]       Preventive Care Advice   This is general advice given by our system to help you stay healthy. However, your care team may have specific advice just for you. Please talk to your care team about your preventive care needs.  Nutrition  Eat 5 or more servings of fruits and vegetables each day.  Try wheat bread, brown rice and whole grain pasta (instead of white bread, rice, and pasta).  Get enough calcium and vitamin D. Check the label on foods and aim for 100% of the RDA (recommended daily allowance).  Lifestyle  Exercise at least 150 minutes each week  (30  minutes a day, 5 days a week).  Do muscle strengthening activities 2 days a week. These help control your weight and prevent disease.  No smoking.  Wear sunscreen to prevent skin cancer.  Have a dental exam and cleaning every 6 months.  Yearly exams  See your health care team every year to talk about:  Any changes in your health.  Any medicines your care team has prescribed.  Preventive care, family planning, and ways to prevent chronic diseases.  Shots (vaccines)   HPV shots (up to age 26), if you've never had them before.  Hepatitis B shots (up to age 59), if you've never had them before.  COVID-19 shot: Get this shot when it's due.  Flu shot: Get a flu shot every year.  Tetanus shot: Get a tetanus shot every 10 years.  Pneumococcal, hepatitis A, and RSV shots: Ask your care team if you need these based on your risk.  Shingles shot (for age 50 and up)  General health tests  Diabetes screening:  Starting at age 35, Get screened for diabetes at least every 3 years.  If you are younger than age 35, ask your care team if you should be screened for diabetes.  Cholesterol test: At age 39, start having a cholesterol test every 5 years, or more often if advised.  Bone density scan (DEXA): At age 50, ask your care team if you should have this scan for osteoporosis (brittle bones).  Hepatitis C: Get tested at least once in your life.  STIs (sexually transmitted infections)  Before age 24: Ask your care team if you should be screened for STIs.  After age 24: Get screened for STIs if you're at risk. You are at risk for STIs (including HIV) if:  You are sexually active with more than one person.  You don't use condoms every time.  You or a partner was diagnosed with a sexually transmitted infection.  If you are at risk for HIV, ask about PrEP medicine to prevent HIV.  Get tested for HIV at least once in your life, whether you are at risk for HIV or not.  Cancer screening tests  Cervical cancer screening: If you have a cervix,  begin getting regular cervical cancer screening tests starting at age 21.  Breast cancer scan (mammogram): If you've ever had breasts, begin having regular mammograms starting at age 40. This is a scan to check for breast cancer.  Colon cancer screening: It is important to start screening for colon cancer at age 45.  Have a colonoscopy test every 10 years (or more often if you're at risk) Or, ask your provider about stool tests like a FIT test every year or Cologuard test every 3 years.  To learn more about your testing options, visit:   .  For help making a decision, visit:   https://bit.ly/by45779.  Prostate cancer screening test: If you have a prostate, ask your care team if a prostate cancer screening test (PSA) at age 55 is right for you.  Lung cancer screening: If you are a current or former smoker ages 50 to 80, ask your care team if ongoing lung cancer screenings are right for you.  For informational purposes only. Not to replace the advice of your health care provider. Copyright   2023 [facility]. All rights reserved. Clinically reviewed by the [facility] Transitions Program. [employer] [id number] - REV 01/24.  Eating Healthy Foods: Care Instructions  With every meal, you can make healthy food choices. Try to eat a variety of fruits, vegetables, whole grains, lean proteins, and low-fat dairy products. This can help you get the right balance of nutrients, including vitamins and minerals. Small changes add up over time. You can start by adding one healthy food to your meals each day.    Try to make half your plate fruits and vegetables, one-fourth whole grains, and one-fourth lean proteins. Try including dairy with your meals.   Eat more fruits and vegetables. Try to have them with most meals and snacks.   Foods for healthy eating    Fruits    These can be fresh, frozen, canned, or dried.  Try to choose whole fruit rather than fruit juice.  Eat a variety of colors.    Vegetables    These can  "be fresh, frozen, canned, or dried.  Beans, peas, and lentils count too.    Whole grains    Choose whole-grain breads, cereals, and noodles.  Try brown rice.    Lean proteins    These can include lean meat, poultry, fish, and eggs.  You can also have tofu, beans, peas, lentils, nuts, and seeds.    Dairy    Try milk, yogurt, and cheese.  Choose low-fat or fat-free when you can.  If you need to, use lactose-free milk or fortified plant-based milk products, such as soy milk.    Water    Drink water when you're thirsty.  Limit sugar-sweetened drinks, including soda, fruit drinks, and sports drinks.  Where can you learn more?  Go to https://www.[employer].net/patiented  Enter T756 in the search box to learn more about \"Eating Healthy Foods: Care Instructions.\"  Current as of: September 20, 2023               Content Version: 14.0    8225-6845 [employer].   Care instructions adapted under license by your healthcare professional. If you have questions about a medical condition or this instruction, always ask your healthcare professional. [employer], [employer] disclaims any warranty or liability for your use of this information.         "

## 2024-08-08 LAB
ALBUMIN SERPL BCG-MCNC: 4.4 G/DL (ref 3.5–5.2)
ALP SERPL-CCNC: 66 U/L (ref 40–150)
ALT SERPL W P-5'-P-CCNC: 14 U/L (ref 0–50)
ANION GAP SERPL CALCULATED.3IONS-SCNC: 15 MMOL/L (ref 7–15)
AST SERPL W P-5'-P-CCNC: 17 U/L (ref 0–45)
BILIRUB SERPL-MCNC: 0.2 MG/DL
BUN SERPL-MCNC: 12.4 MG/DL (ref 6–20)
CALCIUM SERPL-MCNC: 9.3 MG/DL (ref 8.8–10.4)
CHLORIDE SERPL-SCNC: 102 MMOL/L (ref 98–107)
CHOLEST SERPL-MCNC: 230 MG/DL
CREAT SERPL-MCNC: 0.76 MG/DL (ref 0.51–0.95)
EGFRCR SERPLBLD CKD-EPI 2021: >90 ML/MIN/1.73M2
EST. AVERAGE GLUCOSE BLD GHB EST-MCNC: 91 MG/DL
FASTING STATUS PATIENT QL REPORTED: NO
FASTING STATUS PATIENT QL REPORTED: NO
FERRITIN SERPL-MCNC: 27 NG/ML (ref 6–175)
GLUCOSE SERPL-MCNC: 74 MG/DL (ref 70–99)
HBA1C MFR BLD: 4.8 %
HCO3 SERPL-SCNC: 22 MMOL/L (ref 22–29)
HDLC SERPL-MCNC: 43 MG/DL
IRON BINDING CAPACITY (ROCHE): 306 UG/DL (ref 240–430)
IRON SATN MFR SERPL: 10 % (ref 15–46)
IRON SERPL-MCNC: 32 UG/DL (ref 37–145)
LDLC SERPL CALC-MCNC: ABNORMAL MG/DL
NONHDLC SERPL-MCNC: 187 MG/DL
POTASSIUM SERPL-SCNC: 3.7 MMOL/L (ref 3.4–5.3)
PROT SERPL-MCNC: 7.5 G/DL (ref 6.4–8.3)
SODIUM SERPL-SCNC: 139 MMOL/L (ref 135–145)
TRIGL SERPL-MCNC: 500 MG/DL
TSH SERPL DL<=0.005 MIU/L-ACNC: 1.85 UIU/ML (ref 0.3–4.2)
VIT D+METAB SERPL-MCNC: 45 NG/ML (ref 20–50)

## 2024-08-12 DIAGNOSIS — D50.8 OTHER IRON DEFICIENCY ANEMIA: Primary | ICD-10-CM

## 2024-08-12 LAB
HPV HR 12 DNA CVX QL NAA+PROBE: NEGATIVE
HPV16 DNA CVX QL NAA+PROBE: NEGATIVE
HPV18 DNA CVX QL NAA+PROBE: NEGATIVE
HUMAN PAPILLOMA VIRUS FINAL DIAGNOSIS: NORMAL

## 2024-08-12 NOTE — RESULT ENCOUNTER NOTE
Lipids are high but risk score is below 10%  Work on low fat diet, activity    Iron deficiency noted  Prenatal vitamin daily  Ferrous sulfate 325 mg every other day  Recheck Iron studies in 6 weeks      The 10-year ASCVD risk score (Timi FERMIN, et al., 2019) is: 1.1%    Values used to calculate the score:      Age: 43 years      Sex: Female      Is Non- : No      Diabetic: No      Tobacco smoker: No      Systolic Blood Pressure: 120 mmHg      Is BP treated: No      HDL Cholesterol: 43 mg/dL      Total Cholesterol: 230 mg/dL

## 2024-08-14 LAB
BKR LAB AP GYN ADEQUACY: NORMAL
BKR LAB AP GYN INTERPRETATION: NORMAL
BKR LAB AP PREVIOUS ABNORMAL: NORMAL
PATH REPORT.COMMENTS IMP SPEC: NORMAL
PATH REPORT.COMMENTS IMP SPEC: NORMAL
PATH REPORT.RELEVANT HX SPEC: NORMAL

## 2024-09-15 ENCOUNTER — HEALTH MAINTENANCE LETTER (OUTPATIENT)
Age: 44
End: 2024-09-15

## 2024-11-12 DIAGNOSIS — F41.9 ANXIETY: ICD-10-CM

## 2024-11-25 ENCOUNTER — TELEPHONE (OUTPATIENT)
Dept: MAMMOGRAPHY | Facility: OTHER | Age: 44
End: 2024-11-25

## 2025-02-10 ENCOUNTER — OFFICE VISIT (OUTPATIENT)
Dept: FAMILY MEDICINE | Facility: OTHER | Age: 45
End: 2025-02-10
Attending: NURSE PRACTITIONER
Payer: COMMERCIAL

## 2025-02-10 VITALS
OXYGEN SATURATION: 97 % | WEIGHT: 220 LBS | SYSTOLIC BLOOD PRESSURE: 132 MMHG | HEART RATE: 69 BPM | DIASTOLIC BLOOD PRESSURE: 90 MMHG | TEMPERATURE: 97.3 F | RESPIRATION RATE: 16 BRPM | HEIGHT: 65 IN | BODY MASS INDEX: 36.65 KG/M2

## 2025-02-10 DIAGNOSIS — F41.9 ANXIETY: Primary | ICD-10-CM

## 2025-02-10 DIAGNOSIS — I47.10 PAROXYSMAL SUPRAVENTRICULAR TACHYCARDIA: ICD-10-CM

## 2025-02-10 DIAGNOSIS — D50.8 OTHER IRON DEFICIENCY ANEMIA: ICD-10-CM

## 2025-02-10 LAB
ALBUMIN SERPL BCG-MCNC: 4.6 G/DL (ref 3.5–5.2)
ALP SERPL-CCNC: 75 U/L (ref 40–150)
ALT SERPL W P-5'-P-CCNC: 20 U/L (ref 0–50)
ANION GAP SERPL CALCULATED.3IONS-SCNC: 11 MMOL/L (ref 7–15)
AST SERPL W P-5'-P-CCNC: 23 U/L (ref 0–45)
BASOPHILS # BLD AUTO: 0 10E3/UL (ref 0–0.2)
BASOPHILS NFR BLD AUTO: 0 %
BILIRUB SERPL-MCNC: 0.2 MG/DL
BUN SERPL-MCNC: 13.9 MG/DL (ref 6–20)
CALCIUM SERPL-MCNC: 9.6 MG/DL (ref 8.8–10.4)
CHLORIDE SERPL-SCNC: 102 MMOL/L (ref 98–107)
CREAT SERPL-MCNC: 0.85 MG/DL (ref 0.51–0.95)
EGFRCR SERPLBLD CKD-EPI 2021: 87 ML/MIN/1.73M2
EOSINOPHIL # BLD AUTO: 0.2 10E3/UL (ref 0–0.7)
EOSINOPHIL NFR BLD AUTO: 3 %
ERYTHROCYTE [DISTWIDTH] IN BLOOD BY AUTOMATED COUNT: 13.3 % (ref 10–15)
FERRITIN SERPL-MCNC: 25 NG/ML (ref 6–175)
GLUCOSE SERPL-MCNC: 87 MG/DL (ref 70–99)
HCO3 SERPL-SCNC: 26 MMOL/L (ref 22–29)
HCT VFR BLD AUTO: 38.8 % (ref 35–47)
HGB BLD-MCNC: 12.8 G/DL (ref 11.7–15.7)
IMM GRANULOCYTES # BLD: 0 10E3/UL
IMM GRANULOCYTES NFR BLD: 0 %
IRON BINDING CAPACITY (ROCHE): 348 UG/DL (ref 240–430)
IRON SATN MFR SERPL: 14 % (ref 15–46)
IRON SERPL-MCNC: 47 UG/DL (ref 37–145)
LYMPHOCYTES # BLD AUTO: 2.9 10E3/UL (ref 0.8–5.3)
LYMPHOCYTES NFR BLD AUTO: 36 %
MCH RBC QN AUTO: 25.8 PG (ref 26.5–33)
MCHC RBC AUTO-ENTMCNC: 33 G/DL (ref 31.5–36.5)
MCV RBC AUTO: 78 FL (ref 78–100)
MONOCYTES # BLD AUTO: 0.5 10E3/UL (ref 0–1.3)
MONOCYTES NFR BLD AUTO: 6 %
NEUTROPHILS # BLD AUTO: 4.5 10E3/UL (ref 1.6–8.3)
NEUTROPHILS NFR BLD AUTO: 56 %
PLATELET # BLD AUTO: 300 10E3/UL (ref 150–450)
POTASSIUM SERPL-SCNC: 3.7 MMOL/L (ref 3.4–5.3)
PROT SERPL-MCNC: 8.2 G/DL (ref 6.4–8.3)
RBC # BLD AUTO: 4.96 10E6/UL (ref 3.8–5.2)
SODIUM SERPL-SCNC: 139 MMOL/L (ref 135–145)
TSH SERPL DL<=0.005 MIU/L-ACNC: 1.75 UIU/ML (ref 0.3–4.2)
WBC # BLD AUTO: 8.1 10E3/UL (ref 4–11)

## 2025-02-10 PROCEDURE — 99214 OFFICE O/P EST MOD 30 MIN: CPT | Performed by: NURSE PRACTITIONER

## 2025-02-10 PROCEDURE — G2211 COMPLEX E/M VISIT ADD ON: HCPCS | Performed by: NURSE PRACTITIONER

## 2025-02-10 PROCEDURE — 80050 GENERAL HEALTH PANEL: CPT | Performed by: NURSE PRACTITIONER

## 2025-02-10 PROCEDURE — 83550 IRON BINDING TEST: CPT | Performed by: NURSE PRACTITIONER

## 2025-02-10 PROCEDURE — 83540 ASSAY OF IRON: CPT | Performed by: NURSE PRACTITIONER

## 2025-02-10 PROCEDURE — 36415 COLL VENOUS BLD VENIPUNCTURE: CPT | Performed by: NURSE PRACTITIONER

## 2025-02-10 PROCEDURE — 82728 ASSAY OF FERRITIN: CPT | Performed by: NURSE PRACTITIONER

## 2025-02-10 ASSESSMENT — ANXIETY QUESTIONNAIRES
IF YOU CHECKED OFF ANY PROBLEMS ON THIS QUESTIONNAIRE, HOW DIFFICULT HAVE THESE PROBLEMS MADE IT FOR YOU TO DO YOUR WORK, TAKE CARE OF THINGS AT HOME, OR GET ALONG WITH OTHER PEOPLE: NOT DIFFICULT AT ALL
GAD7 TOTAL SCORE: 0
7. FEELING AFRAID AS IF SOMETHING AWFUL MIGHT HAPPEN: NOT AT ALL
1. FEELING NERVOUS, ANXIOUS, OR ON EDGE: NOT AT ALL
6. BECOMING EASILY ANNOYED OR IRRITABLE: NOT AT ALL
7. FEELING AFRAID AS IF SOMETHING AWFUL MIGHT HAPPEN: NOT AT ALL
2. NOT BEING ABLE TO STOP OR CONTROL WORRYING: NOT AT ALL
GAD7 TOTAL SCORE: 0
5. BEING SO RESTLESS THAT IT IS HARD TO SIT STILL: NOT AT ALL
4. TROUBLE RELAXING: NOT AT ALL
3. WORRYING TOO MUCH ABOUT DIFFERENT THINGS: NOT AT ALL
8. IF YOU CHECKED OFF ANY PROBLEMS, HOW DIFFICULT HAVE THESE MADE IT FOR YOU TO DO YOUR WORK, TAKE CARE OF THINGS AT HOME, OR GET ALONG WITH OTHER PEOPLE?: NOT DIFFICULT AT ALL
GAD7 TOTAL SCORE: 0

## 2025-02-10 ASSESSMENT — PATIENT HEALTH QUESTIONNAIRE - PHQ9
10. IF YOU CHECKED OFF ANY PROBLEMS, HOW DIFFICULT HAVE THESE PROBLEMS MADE IT FOR YOU TO DO YOUR WORK, TAKE CARE OF THINGS AT HOME, OR GET ALONG WITH OTHER PEOPLE: NOT DIFFICULT AT ALL
SUM OF ALL RESPONSES TO PHQ QUESTIONS 1-9: 0
SUM OF ALL RESPONSES TO PHQ QUESTIONS 1-9: 0

## 2025-02-10 ASSESSMENT — PAIN SCALES - GENERAL: PAINLEVEL_OUTOF10: MILD PAIN (2)

## 2025-02-10 NOTE — PATIENT INSTRUCTIONS
Assessment & Plan         Anxiety  - sertraline (ZOLOFT) 50 MG tablet; 1/2 tab daily        Paroxysmal supraventricular tachycardia  - TSH with free T4 reflex  - Comprehensive metabolic panel        Other iron deficiency anemia  - Iron and iron binding capacity  - Ferritin  - CBC with platelets and differential          Return in about 6 months (around 8/10/2025).        Please continue to take all medication as directed  Please notify your pharmacy or our refill line of future refills needed.  Please return sooner than your next scheduled appointment with any concerns.        When your lab results return, we will call you with abnormal findings  You can also view this information in your MyChart, if you have an account.  Please call or [employer] message us with any questions you may have.          Zunilda JASON  [phone]

## 2025-02-10 NOTE — PROGRESS NOTES
Assessment & Plan         Anxiety  - sertraline (ZOLOFT) 50 MG tablet; 1/2 tab daily        Paroxysmal supraventricular tachycardia  - TSH with free T4 reflex  - Comprehensive metabolic panel        Other iron deficiency anemia  - Iron and iron binding capacity  - Ferritin  - CBC with platelets and differential          Return in about 6 months (around 8/10/2025).        Please continue to take all medication as directed  Please notify your pharmacy or our refill line of future refills needed.  Please return sooner than your next scheduled appointment with any concerns.        When your lab results return, we will call you with abnormal findings  You can also view this information in your MyChart, if you have an account.  Please call or [employer] message us with any questions you may have.          Zunilda Matute [facility]  [phone]           Aura is a 44 year old, presenting for the following health issues:  Anxiety          Anxiety   How are you doing with your anxiety since your last visit? Improved   Are you having other symptoms that might be associated with anxiety? No  Have you had a significant life event? No   Are you feeling depressed? No  Do you have any concerns with your use of alcohol or other drugs? No          History of PSVT  Takes Toprol XL daily          Social History     Tobacco Use    Smoking status: Never     Passive exposure: Never    Smokeless tobacco: Never   Vaping Use    Vaping status: Never Used   Substance Use Topics    Alcohol use: Yes     Comment: occasional    Drug use: No         2/6/2024     3:59 PM 8/7/2024     4:20 PM 2/10/2025     3:50 PM   LUZ-7 SCORE   Total Score 1 (minimal anxiety)  0 (minimal anxiety)   Total Score 1 0 0        Patient-reported             2/6/2024     3:58 PM 8/7/2024     4:20 PM 2/10/2025     3:49 PM   PHQ   PHQ-9 Total Score 0 0 0    Q9: Thoughts of better off dead/self-harm past 2 weeks Not at all Not at all Not at all       Patient-reported  Pt wheeled to the room via WC. Changed into a gown and placed on monitor. Call light within reach. Chart up for ERP.            Patient Active Problem List   Diagnosis    Family history of melanoma    Fibrocystic breast changes    Palpitations    PAC (premature atrial contraction)    Paroxysmal supraventricular tachycardia    Anxiety    History of panic attacks     Past Surgical History:   Procedure Laterality Date    APPENDECTOMY  2009    [facility]    C/SECTION, CLASSICAL      x's 3     SECTION  2015    laparotomy    CHOLECYSTECTOMY      GALLBLADDER SURGERY      [facility]    wisdom teeth         Social History     Tobacco Use    Smoking status: Never     Passive exposure: Never    Smokeless tobacco: Never   Substance Use Topics    Alcohol use: Yes     Comment: occasional     Family History   Problem Relation Age of Onset    Hypertension Father     Diabetes Maternal Grandmother     Alzheimer Disease Maternal Grandfather     Heart Disease Paternal Grandmother     Heart Disease Paternal Grandfather     Breast Cancer Maternal Aunt         50's    Colon Cancer Maternal Aunt         early 70's           Current Outpatient Medications   Medication Sig Dispense Refill    CHOLECALCIFEROL PO Take 2,000 Units by mouth      metoprolol succinate ER (TOPROL XL) 25 MG 24 hr tablet TAKE 1 TABLET(25 MG) BY MOUTH DAILY 90 tablet 1    multivitamin w/minerals (THERA-VIT-M) tablet Take 1 tablet by mouth daily      sertraline (ZOLOFT) 50 MG tablet 1/2 tab daily 45 tablet 1         No Known Allergies        Recent Labs   Lab Test 24  1637 23  1633 22  1559 22  1559 20  1031 10/18/19  0844 19  1558   A1C 4.8  --   --   --   --   --  5.1   HDL 43*  --   --   --   --   --   --    TRIG 500*  --   --   --   --   --   --    ALT 14 13  --  17 44 23 15   CR 0.76 0.64   < > 0.64 0.67 0.70 0.72   GFRESTIMATED >90 >90   < > >90 >90 >90 >90   GFRESTBLACK  --   --   --   --  >90 >90 >90   POTASSIUM 3.7 4.0  --  3.8 3.8 3.5 4.0   TSH 1.85 1.49  --  1.63 1.80 2.28 1.68    < > = values in this interval not  "displayed.        BP Readings from Last 3 Encounters:   02/10/25 132/90   08/07/24 120/78   02/06/24 121/85    Wt Readings from Last 3 Encounters:   02/10/25 99.8 kg (220 lb)   08/07/24 101.7 kg (224 lb 3.2 oz)   02/06/24 99.4 kg (219 lb 3.2 oz)                       Review of Systems  Constitutional, neuro, ENT, endocrine, pulmonary, cardiac, gastrointestinal, genitourinary, musculoskeletal, integument and psychiatric systems are negative, except as otherwise noted.          Objective    /90 (BP Location: Right arm, Patient Position: Sitting, Cuff Size: Adult Large)   Pulse 69   Temp 97.3  F (36.3  C) (Tympanic)   Resp 16   Ht 1.651 m (5' 5\")   Wt 99.8 kg (220 lb)   SpO2 97%   Breastfeeding No   BMI 36.61 kg/m    Body mass index is 36.61 kg/m .          Physical Exam   GENERAL: alert and no distress  EYES: Eyes grossly normal to inspection, PERRL and conjunctivae and sclerae normal  HENT: ear canals and TM's normal, nose and mouth without ulcers or lesions  NECK: no adenopathy, no asymmetry, masses, or scars  RESP: lungs clear to auscultation - no rales, rhonchi or wheezes  CV: regular rate and rhythm, normal S1 S2, no S3 or S4, no murmur, click or rub, no peripheral edema  MS: no gross musculoskeletal defects noted, no edema  SKIN: no suspicious lesions or rashes  PSYCH: mentation appears normal, affect normal/bright        The longitudinal plan of care for the diagnosis(es)/condition(s) as documented were addressed during this visit. Due to the added complexity in care, I will continue to support Aura in the subsequent management and with ongoing continuity of care.             Signed Electronically by: Zunilda Matute CNP      "

## 2025-02-11 NOTE — RESULT ENCOUNTER NOTE
All labs are stable      [facility]htaF[facility]  [phone]

## 2025-07-14 ENCOUNTER — PATIENT OUTREACH (OUTPATIENT)
Dept: CARE COORDINATION | Facility: CLINIC | Age: 45
End: 2025-07-14

## 2025-07-28 ENCOUNTER — PATIENT OUTREACH (OUTPATIENT)
Dept: CARE COORDINATION | Facility: CLINIC | Age: 45
End: 2025-07-28

## 2025-08-13 DIAGNOSIS — I47.10 PAROXYSMAL SUPRAVENTRICULAR TACHYCARDIA: ICD-10-CM

## 2025-08-13 RX ORDER — METOPROLOL SUCCINATE 25 MG/1
25 TABLET, EXTENDED RELEASE ORAL DAILY
Qty: 90 TABLET | Refills: 1 | Status: SHIPPED | OUTPATIENT
Start: 2025-08-13

## 2025-08-18 ENCOUNTER — OFFICE VISIT (OUTPATIENT)
Dept: FAMILY MEDICINE | Facility: OTHER | Age: 45
End: 2025-08-18
Attending: NURSE PRACTITIONER
Payer: COMMERCIAL

## 2025-08-18 VITALS
OXYGEN SATURATION: 99 % | TEMPERATURE: 97.8 F | WEIGHT: 225.6 LBS | HEART RATE: 61 BPM | DIASTOLIC BLOOD PRESSURE: 82 MMHG | BODY MASS INDEX: 37.59 KG/M2 | HEIGHT: 65 IN | SYSTOLIC BLOOD PRESSURE: 118 MMHG | RESPIRATION RATE: 16 BRPM

## 2025-08-18 DIAGNOSIS — F41.9 ANXIETY: ICD-10-CM

## 2025-08-18 DIAGNOSIS — Z12.31 ENCOUNTER FOR SCREENING MAMMOGRAM FOR MALIGNANT NEOPLASM OF BREAST: ICD-10-CM

## 2025-08-18 DIAGNOSIS — I47.10 PAROXYSMAL SUPRAVENTRICULAR TACHYCARDIA: Primary | ICD-10-CM

## 2025-08-18 ASSESSMENT — PAIN SCALES - GENERAL: PAINLEVEL_OUTOF10: NO PAIN (0)
